# Patient Record
Sex: FEMALE | Race: WHITE | NOT HISPANIC OR LATINO | Employment: OTHER | URBAN - METROPOLITAN AREA
[De-identification: names, ages, dates, MRNs, and addresses within clinical notes are randomized per-mention and may not be internally consistent; named-entity substitution may affect disease eponyms.]

---

## 2017-09-21 ENCOUNTER — RECORDS - HEALTHEAST (OUTPATIENT)
Dept: LAB | Facility: CLINIC | Age: 62
End: 2017-09-21

## 2017-09-21 LAB
CHOLEST SERPL-MCNC: 282 MG/DL
FASTING STATUS PATIENT QL REPORTED: ABNORMAL
HDLC SERPL-MCNC: 60 MG/DL
LDLC SERPL CALC-MCNC: 192 MG/DL
TRIGL SERPL-MCNC: 150 MG/DL

## 2017-09-22 LAB — HCV AB SERPL QL IA: NEGATIVE

## 2018-06-07 ENCOUNTER — RECORDS - HEALTHEAST (OUTPATIENT)
Dept: LAB | Facility: CLINIC | Age: 63
End: 2018-06-07

## 2018-06-07 LAB
ALBUMIN SERPL-MCNC: 3.8 G/DL (ref 3.5–5)
ALP SERPL-CCNC: 90 U/L (ref 45–120)
ALT SERPL W P-5'-P-CCNC: 18 U/L (ref 0–45)
ANION GAP SERPL CALCULATED.3IONS-SCNC: 10 MMOL/L (ref 5–18)
AST SERPL W P-5'-P-CCNC: 30 U/L (ref 0–40)
BILIRUB SERPL-MCNC: 0.4 MG/DL (ref 0–1)
BUN SERPL-MCNC: 12 MG/DL (ref 8–22)
CALCIUM SERPL-MCNC: 9.3 MG/DL (ref 8.5–10.5)
CHLORIDE BLD-SCNC: 103 MMOL/L (ref 98–107)
CHOLEST SERPL-MCNC: 284 MG/DL
CO2 SERPL-SCNC: 26 MMOL/L (ref 22–31)
CREAT SERPL-MCNC: 0.66 MG/DL (ref 0.6–1.1)
FASTING STATUS PATIENT QL REPORTED: ABNORMAL
GFR SERPL CREATININE-BSD FRML MDRD: >60 ML/MIN/1.73M2
GLUCOSE BLD-MCNC: 95 MG/DL (ref 70–125)
HDLC SERPL-MCNC: 64 MG/DL
LDLC SERPL CALC-MCNC: 201 MG/DL
POTASSIUM BLD-SCNC: 3.9 MMOL/L (ref 3.5–5)
PROT SERPL-MCNC: 6.6 G/DL (ref 6–8)
SODIUM SERPL-SCNC: 139 MMOL/L (ref 136–145)
TRIGL SERPL-MCNC: 96 MG/DL

## 2019-05-21 ENCOUNTER — RECORDS - HEALTHEAST (OUTPATIENT)
Dept: LAB | Facility: CLINIC | Age: 64
End: 2019-05-21

## 2019-05-21 LAB
ALBUMIN SERPL-MCNC: 4 G/DL (ref 3.5–5)
ALP SERPL-CCNC: 73 U/L (ref 45–120)
ALT SERPL W P-5'-P-CCNC: 10 U/L (ref 0–45)
ANION GAP SERPL CALCULATED.3IONS-SCNC: 12 MMOL/L (ref 5–18)
AST SERPL W P-5'-P-CCNC: 21 U/L (ref 0–40)
BILIRUB SERPL-MCNC: 0.3 MG/DL (ref 0–1)
BUN SERPL-MCNC: 17 MG/DL (ref 8–22)
CALCIUM SERPL-MCNC: 10.1 MG/DL (ref 8.5–10.5)
CHLORIDE BLD-SCNC: 99 MMOL/L (ref 98–107)
CHOLEST SERPL-MCNC: 217 MG/DL
CO2 SERPL-SCNC: 27 MMOL/L (ref 22–31)
CREAT SERPL-MCNC: 0.76 MG/DL (ref 0.6–1.1)
FASTING STATUS PATIENT QL REPORTED: ABNORMAL
GFR SERPL CREATININE-BSD FRML MDRD: >60 ML/MIN/1.73M2
GLUCOSE BLD-MCNC: 111 MG/DL (ref 70–125)
HDLC SERPL-MCNC: 62 MG/DL
LDLC SERPL CALC-MCNC: 124 MG/DL
POTASSIUM BLD-SCNC: 3.7 MMOL/L (ref 3.5–5)
PROT SERPL-MCNC: 6.8 G/DL (ref 6–8)
SODIUM SERPL-SCNC: 138 MMOL/L (ref 136–145)
TRIGL SERPL-MCNC: 154 MG/DL

## 2019-09-16 ENCOUNTER — RECORDS - HEALTHEAST (OUTPATIENT)
Dept: LAB | Facility: CLINIC | Age: 64
End: 2019-09-16

## 2019-09-17 ENCOUNTER — RECORDS - HEALTHEAST (OUTPATIENT)
Dept: ADMINISTRATIVE | Facility: OTHER | Age: 64
End: 2019-09-17

## 2019-09-17 ENCOUNTER — RECORDS - HEALTHEAST (OUTPATIENT)
Dept: LAB | Facility: CLINIC | Age: 64
End: 2019-09-17

## 2019-09-17 LAB — 25(OH)D3 SERPL-MCNC: 30.7 NG/ML (ref 30–80)

## 2019-09-19 LAB — BACTERIA SPEC CULT: ABNORMAL

## 2019-10-03 ENCOUNTER — AMBULATORY - HEALTHEAST (OUTPATIENT)
Dept: PHARMACY | Facility: CLINIC | Age: 64
End: 2019-10-03

## 2019-10-03 DIAGNOSIS — M85.80 OSTEOPENIA, SENILE: ICD-10-CM

## 2019-10-11 ENCOUNTER — INFUSION - HEALTHEAST (OUTPATIENT)
Dept: INFUSION THERAPY | Facility: CLINIC | Age: 64
End: 2019-10-11

## 2019-10-11 DIAGNOSIS — M85.80 OSTEOPENIA, SENILE: ICD-10-CM

## 2019-10-11 LAB
ALBUMIN SERPL-MCNC: 3.9 G/DL (ref 3.5–5)
ALP SERPL-CCNC: 80 U/L (ref 45–120)
ALT SERPL W P-5'-P-CCNC: <9 U/L (ref 0–45)
ANION GAP SERPL CALCULATED.3IONS-SCNC: 13 MMOL/L (ref 5–18)
AST SERPL W P-5'-P-CCNC: 17 U/L (ref 0–40)
BILIRUB SERPL-MCNC: 0.3 MG/DL (ref 0–1)
BUN SERPL-MCNC: 10 MG/DL (ref 8–22)
CALCIUM SERPL-MCNC: 9.4 MG/DL (ref 8.5–10.5)
CHLORIDE BLD-SCNC: 101 MMOL/L (ref 98–107)
CO2 SERPL-SCNC: 25 MMOL/L (ref 22–31)
CREAT SERPL-MCNC: 0.76 MG/DL (ref 0.6–1.1)
GFR SERPL CREATININE-BSD FRML MDRD: >60 ML/MIN/1.73M2
GLUCOSE BLD-MCNC: 121 MG/DL (ref 70–125)
POTASSIUM BLD-SCNC: 3.4 MMOL/L (ref 3.5–5)
PROT SERPL-MCNC: 7.2 G/DL (ref 6–8)
SODIUM SERPL-SCNC: 139 MMOL/L (ref 136–145)

## 2019-10-11 RX ORDER — HYDROCHLOROTHIAZIDE 12.5 MG/1
12.5 TABLET ORAL
Status: SHIPPED | COMMUNITY
Start: 2019-10-11

## 2019-10-11 RX ORDER — PRAVASTATIN SODIUM 20 MG
20 TABLET ORAL AT BEDTIME
Status: SHIPPED | COMMUNITY
Start: 2019-10-11

## 2020-03-06 ENCOUNTER — RECORDS - HEALTHEAST (OUTPATIENT)
Dept: LAB | Facility: CLINIC | Age: 65
End: 2020-03-06

## 2020-03-06 LAB
ALBUMIN SERPL-MCNC: 4 G/DL (ref 3.5–5)
ALP SERPL-CCNC: 64 U/L (ref 45–120)
ALT SERPL W P-5'-P-CCNC: 12 U/L (ref 0–45)
ANION GAP SERPL CALCULATED.3IONS-SCNC: 11 MMOL/L (ref 5–18)
AST SERPL W P-5'-P-CCNC: 19 U/L (ref 0–40)
BILIRUB SERPL-MCNC: 0.4 MG/DL (ref 0–1)
BUN SERPL-MCNC: 15 MG/DL (ref 8–22)
CALCIUM SERPL-MCNC: 9.6 MG/DL (ref 8.5–10.5)
CHLORIDE BLD-SCNC: 97 MMOL/L (ref 98–107)
CHOLEST SERPL-MCNC: 232 MG/DL
CO2 SERPL-SCNC: 29 MMOL/L (ref 22–31)
CREAT SERPL-MCNC: 0.71 MG/DL (ref 0.6–1.1)
FASTING STATUS PATIENT QL REPORTED: ABNORMAL
GFR SERPL CREATININE-BSD FRML MDRD: >60 ML/MIN/1.73M2
GLUCOSE BLD-MCNC: 100 MG/DL (ref 70–125)
HDLC SERPL-MCNC: 71 MG/DL
LDLC SERPL CALC-MCNC: 142 MG/DL
POTASSIUM BLD-SCNC: 3.3 MMOL/L (ref 3.5–5)
PROT SERPL-MCNC: 7.1 G/DL (ref 6–8)
SODIUM SERPL-SCNC: 137 MMOL/L (ref 136–145)
TRIGL SERPL-MCNC: 97 MG/DL

## 2020-03-08 LAB — BACTERIA SPEC CULT: ABNORMAL

## 2020-03-26 ENCOUNTER — RECORDS - HEALTHEAST (OUTPATIENT)
Dept: LAB | Facility: CLINIC | Age: 65
End: 2020-03-26

## 2020-03-26 LAB
ANION GAP SERPL CALCULATED.3IONS-SCNC: 12 MMOL/L (ref 5–18)
BUN SERPL-MCNC: 15 MG/DL (ref 8–22)
CALCIUM SERPL-MCNC: 9.2 MG/DL (ref 8.5–10.5)
CHLORIDE BLD-SCNC: 99 MMOL/L (ref 98–107)
CO2 SERPL-SCNC: 26 MMOL/L (ref 22–31)
CREAT SERPL-MCNC: 0.73 MG/DL (ref 0.6–1.1)
GFR SERPL CREATININE-BSD FRML MDRD: >60 ML/MIN/1.73M2
GLUCOSE BLD-MCNC: 95 MG/DL (ref 70–125)
POTASSIUM BLD-SCNC: 3.5 MMOL/L (ref 3.5–5)
SODIUM SERPL-SCNC: 137 MMOL/L (ref 136–145)

## 2020-03-28 LAB — BACTERIA SPEC CULT: ABNORMAL

## 2020-11-10 ENCOUNTER — RECORDS - HEALTHEAST (OUTPATIENT)
Dept: LAB | Facility: CLINIC | Age: 65
End: 2020-11-10

## 2020-11-10 LAB
ANION GAP SERPL CALCULATED.3IONS-SCNC: 10 MMOL/L (ref 5–18)
BUN SERPL-MCNC: 17 MG/DL (ref 8–22)
CALCIUM SERPL-MCNC: 10 MG/DL (ref 8.5–10.5)
CHLORIDE BLD-SCNC: 99 MMOL/L (ref 98–107)
CO2 SERPL-SCNC: 28 MMOL/L (ref 22–31)
CREAT SERPL-MCNC: 0.79 MG/DL (ref 0.6–1.1)
GFR SERPL CREATININE-BSD FRML MDRD: >60 ML/MIN/1.73M2
GLUCOSE BLD-MCNC: 116 MG/DL (ref 70–125)
POTASSIUM BLD-SCNC: 3.7 MMOL/L (ref 3.5–5)
SODIUM SERPL-SCNC: 137 MMOL/L (ref 136–145)

## 2020-11-11 ENCOUNTER — AMBULATORY - HEALTHEAST (OUTPATIENT)
Dept: PHARMACY | Facility: CLINIC | Age: 65
End: 2020-11-11

## 2021-04-16 ENCOUNTER — RECORDS - HEALTHEAST (OUTPATIENT)
Dept: LAB | Facility: CLINIC | Age: 66
End: 2021-04-16

## 2021-04-16 LAB
ANION GAP SERPL CALCULATED.3IONS-SCNC: 12 MMOL/L (ref 5–18)
BUN SERPL-MCNC: 19 MG/DL (ref 8–22)
CALCIUM SERPL-MCNC: 9.3 MG/DL (ref 8.5–10.5)
CHLORIDE BLD-SCNC: 99 MMOL/L (ref 98–107)
CO2 SERPL-SCNC: 28 MMOL/L (ref 22–31)
CREAT SERPL-MCNC: 0.79 MG/DL (ref 0.6–1.1)
GFR SERPL CREATININE-BSD FRML MDRD: >60 ML/MIN/1.73M2
GLUCOSE BLD-MCNC: 77 MG/DL (ref 70–125)
POTASSIUM BLD-SCNC: 4 MMOL/L (ref 3.5–5)
SODIUM SERPL-SCNC: 139 MMOL/L (ref 136–145)

## 2021-05-12 ENCOUNTER — AMBULATORY - HEALTHEAST (OUTPATIENT)
Dept: PHARMACY | Facility: HOSPITAL | Age: 66
End: 2021-05-12

## 2021-05-26 VITALS
DIASTOLIC BLOOD PRESSURE: 52 MMHG | RESPIRATION RATE: 16 BRPM | TEMPERATURE: 98.2 F | HEART RATE: 91 BPM | SYSTOLIC BLOOD PRESSURE: 139 MMHG | OXYGEN SATURATION: 100 %

## 2021-05-30 ENCOUNTER — RECORDS - HEALTHEAST (OUTPATIENT)
Dept: ADMINISTRATIVE | Facility: CLINIC | Age: 66
End: 2021-05-30

## 2021-06-02 NOTE — PROGRESS NOTES
Patient ambulated into [facility] by herself. Patient is alert and oriented and VSS. A peripehral IV was started in her right AC and labs drawn. Patient received Reclast infusion without any problems. Peripheral IV flushed with Normal Saline and discontinued. All questions answered and patient was discharged home.

## 2021-06-16 PROBLEM — M85.80 OSTEOPENIA, SENILE: Status: ACTIVE | Noted: 2019-10-03

## 2021-06-22 DIAGNOSIS — M85.80 OSTEOPENIA, SENILE: Primary | ICD-10-CM

## 2021-06-22 RX ORDER — METHYLPREDNISOLONE SODIUM SUCCINATE 125 MG/2ML
125 INJECTION, POWDER, LYOPHILIZED, FOR SOLUTION INTRAMUSCULAR; INTRAVENOUS
Status: CANCELLED
Start: 2021-07-12

## 2021-06-22 RX ORDER — MEPERIDINE HYDROCHLORIDE 25 MG/ML
25 INJECTION INTRAMUSCULAR; INTRAVENOUS; SUBCUTANEOUS EVERY 30 MIN PRN
Status: CANCELLED | OUTPATIENT
Start: 2021-07-12

## 2021-06-22 RX ORDER — HEPARIN SODIUM,PORCINE 10 UNIT/ML
5 VIAL (ML) INTRAVENOUS
Status: CANCELLED | OUTPATIENT
Start: 2021-07-12

## 2021-06-22 RX ORDER — EPINEPHRINE 1 MG/ML
0.3 INJECTION, SOLUTION, CONCENTRATE INTRAVENOUS EVERY 5 MIN PRN
Status: CANCELLED | OUTPATIENT
Start: 2021-07-12

## 2021-06-22 RX ORDER — ALBUTEROL SULFATE 0.83 MG/ML
2.5 SOLUTION RESPIRATORY (INHALATION)
Status: CANCELLED | OUTPATIENT
Start: 2021-07-12

## 2021-06-22 RX ORDER — NALOXONE HYDROCHLORIDE 0.4 MG/ML
0.2 INJECTION, SOLUTION INTRAMUSCULAR; INTRAVENOUS; SUBCUTANEOUS
Status: CANCELLED | OUTPATIENT
Start: 2021-07-12

## 2021-06-22 RX ORDER — HEPARIN SODIUM (PORCINE) LOCK FLUSH IV SOLN 100 UNIT/ML 100 UNIT/ML
5 SOLUTION INTRAVENOUS
Status: CANCELLED | OUTPATIENT
Start: 2021-07-12

## 2021-06-22 RX ORDER — ZOLEDRONIC ACID 5 MG/100ML
5 INJECTION, SOLUTION INTRAVENOUS ONCE
Status: CANCELLED
Start: 2021-07-12 | End: 2021-07-12

## 2021-06-22 RX ORDER — DIPHENHYDRAMINE HYDROCHLORIDE 50 MG/ML
50 INJECTION INTRAMUSCULAR; INTRAVENOUS
Status: CANCELLED
Start: 2021-07-12

## 2021-06-22 RX ORDER — ALBUTEROL SULFATE 90 UG/1
1-2 AEROSOL, METERED RESPIRATORY (INHALATION)
Status: CANCELLED
Start: 2021-07-12

## 2022-01-09 ENCOUNTER — LAB REQUISITION (OUTPATIENT)
Dept: LAB | Facility: CLINIC | Age: 67
End: 2022-01-09
Payer: COMMERCIAL

## 2022-01-09 DIAGNOSIS — J01.40 ACUTE PANSINUSITIS, UNSPECIFIED: ICD-10-CM

## 2022-01-09 PROCEDURE — U0005 INFEC AGEN DETEC AMPLI PROBE: HCPCS | Mod: ORL | Performed by: PHYSICIAN ASSISTANT

## 2022-01-10 LAB — SARS-COV-2 RNA RESP QL NAA+PROBE: NEGATIVE

## 2022-03-25 ENCOUNTER — LAB REQUISITION (OUTPATIENT)
Dept: LAB | Facility: CLINIC | Age: 67
End: 2022-03-25

## 2022-03-25 DIAGNOSIS — I10 ESSENTIAL (PRIMARY) HYPERTENSION: ICD-10-CM

## 2022-03-25 DIAGNOSIS — E78.5 HYPERLIPIDEMIA, UNSPECIFIED: ICD-10-CM

## 2022-03-25 LAB
ANION GAP SERPL CALCULATED.3IONS-SCNC: 13 MMOL/L (ref 5–18)
BUN SERPL-MCNC: 12 MG/DL (ref 8–22)
CALCIUM SERPL-MCNC: 10.2 MG/DL (ref 8.5–10.5)
CHLORIDE BLD-SCNC: 97 MMOL/L (ref 98–107)
CHOLEST SERPL-MCNC: 304 MG/DL
CO2 SERPL-SCNC: 28 MMOL/L (ref 22–31)
CREAT SERPL-MCNC: 0.82 MG/DL (ref 0.6–1.1)
FASTING STATUS PATIENT QL REPORTED: ABNORMAL
GFR SERPL CREATININE-BSD FRML MDRD: 77 ML/MIN/1.73M2
GLUCOSE BLD-MCNC: 75 MG/DL (ref 70–125)
HDLC SERPL-MCNC: 72 MG/DL
LDLC SERPL CALC-MCNC: 198 MG/DL
POTASSIUM BLD-SCNC: 3.9 MMOL/L (ref 3.5–5)
SODIUM SERPL-SCNC: 138 MMOL/L (ref 136–145)
TRIGL SERPL-MCNC: 168 MG/DL

## 2022-03-25 PROCEDURE — 80061 LIPID PANEL: CPT | Performed by: PHYSICIAN ASSISTANT

## 2022-03-25 PROCEDURE — 80048 BASIC METABOLIC PNL TOTAL CA: CPT | Performed by: PHYSICIAN ASSISTANT

## 2023-03-07 ENCOUNTER — LAB REQUISITION (OUTPATIENT)
Dept: LAB | Facility: CLINIC | Age: 68
End: 2023-03-07

## 2023-03-07 DIAGNOSIS — I10 ESSENTIAL (PRIMARY) HYPERTENSION: ICD-10-CM

## 2023-03-07 DIAGNOSIS — E78.5 HYPERLIPIDEMIA, UNSPECIFIED: ICD-10-CM

## 2023-03-07 DIAGNOSIS — F33.2 MAJOR DEPRESSIVE DISORDER, RECURRENT SEVERE WITHOUT PSYCHOTIC FEATURES (H): ICD-10-CM

## 2023-03-07 LAB
ALBUMIN SERPL BCG-MCNC: 4.4 G/DL (ref 3.5–5.2)
ALP SERPL-CCNC: 83 U/L (ref 35–104)
ALT SERPL W P-5'-P-CCNC: 9 U/L (ref 10–35)
ANION GAP SERPL CALCULATED.3IONS-SCNC: 15 MMOL/L (ref 7–15)
AST SERPL W P-5'-P-CCNC: 20 U/L (ref 10–35)
BILIRUB SERPL-MCNC: 0.2 MG/DL
BUN SERPL-MCNC: 17.9 MG/DL (ref 8–23)
CALCIUM SERPL-MCNC: 9.9 MG/DL (ref 8.8–10.2)
CHLORIDE SERPL-SCNC: 97 MMOL/L (ref 98–107)
CHOLEST SERPL-MCNC: 243 MG/DL
CREAT SERPL-MCNC: 0.86 MG/DL (ref 0.51–0.95)
DEPRECATED HCO3 PLAS-SCNC: 26 MMOL/L (ref 22–29)
GFR SERPL CREATININE-BSD FRML MDRD: 73 ML/MIN/1.73M2
GLUCOSE SERPL-MCNC: 115 MG/DL (ref 70–99)
HDLC SERPL-MCNC: 71 MG/DL
LDLC SERPL CALC-MCNC: 140 MG/DL
NONHDLC SERPL-MCNC: 172 MG/DL
POTASSIUM SERPL-SCNC: 4.3 MMOL/L (ref 3.4–5.3)
PROT SERPL-MCNC: 7.2 G/DL (ref 6.4–8.3)
SODIUM SERPL-SCNC: 138 MMOL/L (ref 136–145)
T4 FREE SERPL-MCNC: 1.31 NG/DL (ref 0.9–1.7)
TRIGL SERPL-MCNC: 162 MG/DL
TSH SERPL DL<=0.005 MIU/L-ACNC: 4.25 UIU/ML (ref 0.3–4.2)

## 2023-03-07 PROCEDURE — 80053 COMPREHEN METABOLIC PANEL: CPT | Performed by: PHYSICIAN ASSISTANT

## 2023-03-07 PROCEDURE — 80061 LIPID PANEL: CPT | Performed by: PHYSICIAN ASSISTANT

## 2023-03-07 PROCEDURE — 84439 ASSAY OF FREE THYROXINE: CPT | Performed by: PHYSICIAN ASSISTANT

## 2023-03-07 PROCEDURE — 84443 ASSAY THYROID STIM HORMONE: CPT | Performed by: PHYSICIAN ASSISTANT

## 2023-04-03 ENCOUNTER — LAB REQUISITION (OUTPATIENT)
Dept: LAB | Facility: CLINIC | Age: 68
End: 2023-04-03

## 2023-04-03 DIAGNOSIS — K14.6 GLOSSODYNIA: ICD-10-CM

## 2023-04-03 LAB — VIT B12 SERPL-MCNC: 524 PG/ML (ref 232–1245)

## 2023-04-03 PROCEDURE — 82607 VITAMIN B-12: CPT | Performed by: PHYSICIAN ASSISTANT

## 2023-04-03 PROCEDURE — 82306 VITAMIN D 25 HYDROXY: CPT | Performed by: PHYSICIAN ASSISTANT

## 2023-04-04 LAB — DEPRECATED CALCIDIOL+CALCIFEROL SERPL-MC: 27 UG/L (ref 20–75)

## 2023-10-02 ENCOUNTER — APPOINTMENT (OUTPATIENT)
Dept: CT IMAGING | Facility: CLINIC | Age: 68
End: 2023-10-02
Attending: EMERGENCY MEDICINE
Payer: COMMERCIAL

## 2023-10-02 ENCOUNTER — HOSPITAL ENCOUNTER (EMERGENCY)
Facility: CLINIC | Age: 68
Discharge: HOME | End: 2023-10-02
Attending: EMERGENCY MEDICINE | Admitting: EMERGENCY MEDICINE
Payer: COMMERCIAL

## 2023-10-02 VITALS
DIASTOLIC BLOOD PRESSURE: 105 MMHG | HEART RATE: 109 BPM | SYSTOLIC BLOOD PRESSURE: 166 MMHG | OXYGEN SATURATION: 95 % | RESPIRATION RATE: 18 BRPM | TEMPERATURE: 98.2 F

## 2023-10-02 DIAGNOSIS — K62.89 PROCTITIS: ICD-10-CM

## 2023-10-02 DIAGNOSIS — R19.7 DIARRHEA, UNSPECIFIED TYPE: ICD-10-CM

## 2023-10-02 LAB
ALBUMIN SERPL BCG-MCNC: 4.5 G/DL (ref 3.5–5.2)
ALBUMIN UR-MCNC: 10 MG/DL
ALP SERPL-CCNC: 100 U/L (ref 35–104)
ALT SERPL W P-5'-P-CCNC: 10 U/L (ref 0–50)
ANION GAP SERPL CALCULATED.3IONS-SCNC: 10 MMOL/L (ref 7–15)
APPEARANCE UR: CLEAR
AST SERPL W P-5'-P-CCNC: 23 U/L (ref 0–45)
BASO+EOS+MONOS # BLD AUTO: ABNORMAL 10*3/UL
BASO+EOS+MONOS NFR BLD AUTO: ABNORMAL %
BASOPHILS # BLD AUTO: 0 10E3/UL (ref 0–0.2)
BASOPHILS NFR BLD AUTO: 1 %
BILIRUB DIRECT SERPL-MCNC: <0.2 MG/DL (ref 0–0.3)
BILIRUB SERPL-MCNC: 0.2 MG/DL
BILIRUB UR QL STRIP: NEGATIVE
BUN SERPL-MCNC: 8.7 MG/DL (ref 8–23)
CALCIUM SERPL-MCNC: 9.5 MG/DL (ref 8.8–10.2)
CHLORIDE SERPL-SCNC: 97 MMOL/L (ref 98–107)
COLOR UR AUTO: ABNORMAL
CREAT SERPL-MCNC: 0.74 MG/DL (ref 0.51–0.95)
CRP SERPL-MCNC: 13.4 MG/L
DEPRECATED HCO3 PLAS-SCNC: 29 MMOL/L (ref 22–29)
EGFRCR SERPLBLD CKD-EPI 2021: 87 ML/MIN/1.73M2
EOSINOPHIL # BLD AUTO: 0.1 10E3/UL (ref 0–0.7)
EOSINOPHIL NFR BLD AUTO: 1 %
ERYTHROCYTE [DISTWIDTH] IN BLOOD BY AUTOMATED COUNT: 15.4 % (ref 10–15)
GLUCOSE SERPL-MCNC: 93 MG/DL (ref 70–99)
GLUCOSE UR STRIP-MCNC: NEGATIVE MG/DL
HCT VFR BLD AUTO: 38 % (ref 35–47)
HGB BLD-MCNC: 12.2 G/DL (ref 11.7–15.7)
HGB UR QL STRIP: NEGATIVE
IMM GRANULOCYTES # BLD: 0 10E3/UL
IMM GRANULOCYTES NFR BLD: 0 %
KETONES UR STRIP-MCNC: NEGATIVE MG/DL
LACTATE SERPL-SCNC: 1.2 MMOL/L (ref 0.7–2)
LEUKOCYTE ESTERASE UR QL STRIP: ABNORMAL
LIPASE SERPL-CCNC: 31 U/L (ref 13–60)
LYMPHOCYTES # BLD AUTO: 2 10E3/UL (ref 0.8–5.3)
LYMPHOCYTES NFR BLD AUTO: 41 %
MAGNESIUM SERPL-MCNC: 1.8 MG/DL (ref 1.7–2.3)
MCH RBC QN AUTO: 25.8 PG (ref 26.5–33)
MCHC RBC AUTO-ENTMCNC: 32.1 G/DL (ref 31.5–36.5)
MCV RBC AUTO: 81 FL (ref 78–100)
MONOCYTES # BLD AUTO: 0.7 10E3/UL (ref 0–1.3)
MONOCYTES NFR BLD AUTO: 13 %
MUCOUS THREADS #/AREA URNS LPF: PRESENT /LPF
NEUTROPHILS # BLD AUTO: 2.2 10E3/UL (ref 1.6–8.3)
NEUTROPHILS NFR BLD AUTO: 44 %
NITRATE UR QL: NEGATIVE
NRBC # BLD AUTO: 0 10E3/UL
NRBC BLD AUTO-RTO: 0 /100
PH UR STRIP: 6.5 [PH] (ref 5–7)
PLATELET # BLD AUTO: 355 10E3/UL (ref 150–450)
POTASSIUM SERPL-SCNC: 3.4 MMOL/L (ref 3.4–5.3)
PROT SERPL-MCNC: 7.6 G/DL (ref 6.4–8.3)
RBC # BLD AUTO: 4.72 10E6/UL (ref 3.8–5.2)
RBC URINE: 5 /HPF
SODIUM SERPL-SCNC: 136 MMOL/L (ref 135–145)
SP GR UR STRIP: <1.005 (ref 1–1.03)
SQUAMOUS EPITHELIAL: 2 /HPF
UROBILINOGEN UR STRIP-MCNC: <2 MG/DL
WBC # BLD AUTO: 5 10E3/UL (ref 4–11)
WBC URINE: 17 /HPF

## 2023-10-02 PROCEDURE — 87086 URINE CULTURE/COLONY COUNT: CPT | Performed by: FAMILY MEDICINE

## 2023-10-02 PROCEDURE — 86140 C-REACTIVE PROTEIN: CPT | Performed by: EMERGENCY MEDICINE

## 2023-10-02 PROCEDURE — 99285 EMERGENCY DEPT VISIT HI MDM: CPT | Mod: 25

## 2023-10-02 PROCEDURE — 82248 BILIRUBIN DIRECT: CPT | Performed by: FAMILY MEDICINE

## 2023-10-02 PROCEDURE — 96360 HYDRATION IV INFUSION INIT: CPT | Mod: 59

## 2023-10-02 PROCEDURE — 258N000003 HC RX IP 258 OP 636: Performed by: FAMILY MEDICINE

## 2023-10-02 PROCEDURE — 83735 ASSAY OF MAGNESIUM: CPT | Performed by: FAMILY MEDICINE

## 2023-10-02 PROCEDURE — 83605 ASSAY OF LACTIC ACID: CPT | Performed by: FAMILY MEDICINE

## 2023-10-02 PROCEDURE — 80053 COMPREHEN METABOLIC PANEL: CPT | Performed by: FAMILY MEDICINE

## 2023-10-02 PROCEDURE — 83690 ASSAY OF LIPASE: CPT | Performed by: FAMILY MEDICINE

## 2023-10-02 PROCEDURE — 96361 HYDRATE IV INFUSION ADD-ON: CPT

## 2023-10-02 PROCEDURE — 81001 URINALYSIS AUTO W/SCOPE: CPT | Performed by: FAMILY MEDICINE

## 2023-10-02 PROCEDURE — 82374 ASSAY BLOOD CARBON DIOXIDE: CPT | Performed by: FAMILY MEDICINE

## 2023-10-02 PROCEDURE — 85025 COMPLETE CBC W/AUTO DIFF WBC: CPT | Performed by: FAMILY MEDICINE

## 2023-10-02 PROCEDURE — 36415 COLL VENOUS BLD VENIPUNCTURE: CPT | Performed by: FAMILY MEDICINE

## 2023-10-02 PROCEDURE — 74177 CT ABD & PELVIS W/CONTRAST: CPT

## 2023-10-02 PROCEDURE — 250N000011 HC RX IP 250 OP 636: Mod: JZ | Performed by: EMERGENCY MEDICINE

## 2023-10-02 RX ORDER — IOPAMIDOL 755 MG/ML
90 INJECTION, SOLUTION INTRAVASCULAR ONCE
Status: COMPLETED | OUTPATIENT
Start: 2023-10-02 | End: 2023-10-02

## 2023-10-02 RX ADMIN — SODIUM CHLORIDE 1000 ML: 9 INJECTION, SOLUTION INTRAVENOUS at 17:28

## 2023-10-02 RX ADMIN — IOPAMIDOL 90 ML: 755 INJECTION, SOLUTION INTRAVENOUS at 17:16

## 2023-10-02 ASSESSMENT — ACTIVITIES OF DAILY LIVING (ADL): ADLS_ACUITY_SCORE: 35

## 2023-10-02 NOTE — ED TRIAGE NOTES
"Pt presents to the ED with c/o dehydration. Pt reports that she's been sick since 9/17, dx with colitis. Pt states \"I've only drank two water bottles since the 17th\". Pt endorses N/V/D. Pt reports not being able to keep anything down. Denies any fevers at home. Is scheduled for a GI appointment tomorrow to be tested for c.diff.      Triage Assessment       Row Name 10/02/23 8344       Triage Assessment (Adult)    Airway WDL WDL       Respiratory WDL    Respiratory WDL WDL       Skin Circulation/Temperature WDL    Skin Circulation/Temperature WDL WDL       Cardiac WDL    Cardiac WDL WDL       Peripheral/Neurovascular WDL    Peripheral Neurovascular WDL WDL       Cognitive/Neuro/Behavioral WDL    Cognitive/Neuro/Behavioral WDL WDL                    "

## 2023-10-02 NOTE — ED PROVIDER NOTES
"EMERGENCY DEPARTMENT ENCOUNTER      NAME: Arlene Becker  AGE: 68 year old female  YOB: 1955  MRN: [id number]  EVALUATION DATE & TIME: No admission date for patient encounter.    PCP: Vivian Aranda    ED PROVIDER: Ismael Pierre M.D.      Chief Complaint   Patient presents with    Dehydration    Nausea, Vomiting, & Diarrhea         FINAL IMPRESSION:  Proctitis  Diarrhea      ED COURSE & MEDICAL DECISION MAKING:    Pertinent Labs & Imaging studies reviewed. (See chart for details)  68 year old female presents to the Emergency Department for evaluation of ongoing diarrhea and abdominal pain.  Patient ports symptoms have been ongoing for almost 2 weeks.  Patient relates its \"my colitis\".  Patient states she has a history of ulcerative colitis and is scheduled to see GI.  Typically they treat this with budesonide but are unwilling to start therapy until patient is checked for C. difficile.  Patient reports last antibiotics more than 5 months ago.  Patient seen in triage area due to ED overcrowding.  Exam is quite benign.  She has hypoactive bowel sounds but with only minimal tenderness.  We will proceed with laboratory evaluation and CT imaging to assess for evidence of infection, colitis, dehydration. Patient appears non toxic with stable vitals signs. Overall exam is benign.    4:50 PM I met with the patient and performed the physical exam.   6:46 PM.  CBC is unremarkable.  White cell count is 5.0.  7 PM.  Patient discussed with [facility].  Patient with leukoclastic colitis rather than ulcerative colitis.  As her laboratory evaluation is reassuring patient may follow-up tomorrow to allow testing for C. difficile prior to starting medications.   7:09 PM I rechecked ad updated the patient. At the conclusion of the encounter I discussed the results of all of the tests and the disposition. The questions were answered and return precautions provided. The patient or family acknowledged understanding and was " agreeable with the care plan.      .     Medical Decision Making    History:  Supplemental history from: Documented in chart, if applicable  External Record(s) reviewed: Documented in chart, if applicable.    Work Up:  Chart documentation includes differential considered and any EKGs or imaging independently interpreted by provider, where specified.  In additional to work up documented, I considered the following work up: Documented in chart, if applicable.    External consultation:  Discussion of management with another provider: Documented in chart, if applicable    Complicating factors:  Care impacted by chronic illness: Chronic Pain, Hyperlipidemia, Hypertension, Mental Health, and Other: ulcerative colitis  Care affected by social determinants of health: N/A    Disposition considerations: Discharge. No recommendations on prescription strength medication(s). See documentation for any additional details.       MEDICATIONS GIVEN IN THE EMERGENCY:  Medications   sodium chloride 0.9% BOLUS 1,000 mL (has no administration in time range)       NEW PRESCRIPTIONS STARTED AT TODAY'S ER VISIT  New Prescriptions    No medications on file          =================================================================    HPI    Patient information was obtained from: patient    Use of Intrepreter: N/A       Arlene Becker is a 68 year old female with a pertient medical history of ulcerative colitis, HTN, and hyperlipidemia who presents to the ED for evaluation of nausea, vomiting, and diarrhea.    Patient reports having a colitis flare-up with fever, chills, abdominal pain, and prolonged diarrhea that started on 9/17/23. She says she called her gastroenterologist a couple days ago and has an appointment tomorrow to go and get some testing done before she could get her budesonide refilled. She says she has not needed budesonide for a number of years prior. Patient has history of hysterectomy and says the last antibiotics taken were  months ago. Otherwise, patient denies any blood in stool, anything new in diet, or any recent antibiotics.      REVIEW OF SYSTEMS   Constitutional:  Denies fever, chills  Respiratory:  Denies productive cough or increased work of breathing  Cardiovascular:  Denies chest pain, palpitations  GI:  Denies abdominal pain, nausea, vomiting, or change in bowel or bladder habits   Musculoskeletal:  Denies any new muscle/joint swelling  Skin:  Denies rash   Neurologic:  Denies focal weakness  All systems negative except as marked.     PAST MEDICAL HISTORY:  History reviewed. No pertinent past medical history.    PAST SURGICAL HISTORY:  Past Surgical History:   Procedure Laterality Date     SECTION      HYSTERECTOMY      RELEASE CARPAL TUNNEL      TEMPOROMANDIBULAR JOINT ARTHROPLASTY           CURRENT MEDICATIONS:      Current Facility-Administered Medications:     sodium chloride 0.9% BOLUS 1,000 mL, 1,000 mL, Intravenous, Once, Cory Rodriguez MD    Current Outpatient Medications:     budesonide (ENTOCORT EC) 3 mg 24 hr capsule, [BUDESONIDE (ENTOCORT EC) 3 MG 24 HR CAPSULE] Take 3 mg by mouth every morning., Disp: , Rfl:     calcium-vitamin D (CALCIUM-VITAMIN D) 500 mg(1,250mg) -200 unit per tablet, [CALCIUM-VITAMIN D (CALCIUM-VITAMIN D) 500 MG(1,250MG) -200 UNIT PER TABLET] Take 2 tablets by mouth daily., Disp: , Rfl:     cyanocobalamin, vitamin B-12, 1,000 mcg/15 mL Liqd, [CYANOCOBALAMIN, VITAMIN B-12, 1,000 MCG/15 ML LIQD] Take 1,000 mg by mouth daily., Disp: , Rfl:     esomeprazole (NEXIUM) 20 MG capsule, [ESOMEPRAZOLE (NEXIUM) 20 MG CAPSULE] Take 20 mg by mouth every morning before breakfast., Disp: , Rfl:     hydroCHLOROthiazide (HYDRODIURIL) 12.5 MG tablet, [HYDROCHLOROTHIAZIDE (HYDRODIURIL) 12.5 MG TABLET] Take 12.5 mg by mouth 2 (two) times a day at 9am and 6pm., Disp: , Rfl:     pravastatin (PRAVACHOL) 20 MG tablet, [PRAVASTATIN (PRAVACHOL) 20 MG TABLET] Take 20 mg by mouth at bedtime., Disp: , Rfl:      rosuvastatin (CRESTOR) 5 MG tablet, [ROSUVASTATIN (CRESTOR) 5 MG TABLET] Take 5 mg by mouth bedtime., Disp: , Rfl:     spironolactone (ALDACTONE) 25 MG tablet, [SPIRONOLACTONE (ALDACTONE) 25 MG TABLET] Take 25 mg by mouth daily., Disp: , Rfl:     venlafaxine (EFFEXOR-XR) 75 MG 24 hr capsule, [VENLAFAXINE (EFFEXOR-XR) 75 MG 24 HR CAPSULE] Take 75 mg by mouth daily., Disp: , Rfl:     ALLERGIES:  Allergies   Allergen Reactions    Sulfa (Sulfonamide Antibiotics) [Sulfa Antibiotics] Hives       FAMILY HISTORY:  Family History   Problem Relation Age of Onset    Coronary Artery Disease Mother     Coronary Artery Disease Father     Coronary Artery Disease Brother         stent       SOCIAL HISTORY:   Social History     Socioeconomic History    Marital status:      Spouse name: None    Number of children: None    Years of education: None    Highest education level: None   Tobacco Use    Smoking status: Former     Types: Cigarettes     Quit date: 5/1/2013     Years since quitting: 10.4       VITALS:  Patient Vitals for the past 24 hrs:   BP Temp Temp src Pulse Resp SpO2   10/02/23 1342 (!) 166/105 98.2  F (36.8  C) Oral 109 18 95 %        PHYSICAL EXAM    Constitutional:  Awake, alert, in mild distress  HENT:  Normocephalic, Atraumatic. Bilateral external ears normal. Oropharynx moist. Nose normal. Neck- Normal range of motion with no guarding, No midline cervical tenderness, Supple, No stridor.   Eyes:  PERRL, EOMI with no signs of entrapment, Conjunctiva normal, No discharge.   Respiratory:  Normal breath sounds, No respiratory distress, No wheezing.    Cardiovascular:  Normal heart rate, Normal rhythm, No appreciable rubs or gallops.   GI:  Soft, No tenderness, No distension, No palpable masses. Hypoactive bowel sounds.  Musculoskeletal:  Intact distal pulses, No edema. Good range of motion in all major joints. No tenderness to palpation or major deformities noted.  Integument:  Warm, Dry, No erythema, No rash.    Neurologic:  Alert & oriented, Normal motor function, Normal sensory function, No focal deficits noted.   Psychiatric:  Affect normal, Judgment normal, Mood normal.     LAB:  All pertinent labs reviewed and interpreted.  Results for orders placed or performed during the hospital encounter of 10/02/23   Basic metabolic panel   Result Value Ref Range    Sodium 136 135 - 145 mmol/L    Potassium 3.4 3.4 - 5.3 mmol/L    Chloride 97 (L) 98 - 107 mmol/L    Carbon Dioxide (CO2) 29 22 - 29 mmol/L    Anion Gap 10 7 - 15 mmol/L    Urea Nitrogen 8.7 8.0 - 23.0 mg/dL    Creatinine 0.74 0.51 - 0.95 mg/dL    GFR Estimate 87 >60 mL/min/1.73m2    Calcium 9.5 8.8 - 10.2 mg/dL    Glucose 93 70 - 99 mg/dL   Hepatic function panel   Result Value Ref Range    Protein Total 7.6 6.4 - 8.3 g/dL    Albumin 4.5 3.5 - 5.2 g/dL    Bilirubin Total 0.2 <=1.2 mg/dL    Alkaline Phosphatase 100 35 - 104 U/L    AST 23 0 - 45 U/L    ALT 10 0 - 50 U/L    Bilirubin Direct <0.20 0.00 - 0.30 mg/dL   Lactic acid whole blood   Result Value Ref Range    Lactic Acid 1.2 0.7 - 2.0 mmol/L   Result Value Ref Range    Lipase 31 13 - 60 U/L   Result Value Ref Range    Magnesium 1.8 1.7 - 2.3 mg/dL   CBC with platelets and differential   Result Value Ref Range    WBC Count 5.0 4.0 - 11.0 10e3/uL    RBC Count 4.72 3.80 - 5.20 10e6/uL    Hemoglobin 12.2 11.7 - 15.7 g/dL    Hematocrit 38.0 35.0 - 47.0 %    MCV 81 78 - 100 fL    MCH 25.8 (L) 26.5 - 33.0 pg    MCHC 32.1 31.5 - 36.5 g/dL    RDW 15.4 (H) 10.0 - 15.0 %    Platelet Count 355 150 - 450 10e3/uL    % Neutrophils 44 %    % Lymphocytes 41 %    % Monocytes 13 %    Mids % (Monos, Eos, Basos)      % Eosinophils 1 %    % Basophils 1 %    % Immature Granulocytes 0 %    NRBCs per 100 WBC 0 <1 /100    Absolute Neutrophils 2.2 1.6 - 8.3 10e3/uL    Absolute Lymphocytes 2.0 0.8 - 5.3 10e3/uL    Absolute Monocytes 0.7 0.0 - 1.3 10e3/uL    Mids Abs (Monos, Eos, Basos)      Absolute Eosinophils 0.1 0.0 - 0.7 10e3/uL     Absolute Basophils 0.0 0.0 - 0.2 10e3/uL    Absolute Immature Granulocytes 0.0 <=0.4 10e3/uL    Absolute NRBCs 0.0 10e3/uL       RADIOLOGY:  Reviewed all pertinent imaging. Please see official radiology report.  CT Abdomen Pelvis w Contrast    Result Date: 10/2/2023  EXAM: CT ABDOMEN PELVIS W CONTRAST LOCATION: [facility] DATE: 10/2/2023 INDICATION: Diarrhea. History of ulcerative colitis. COMPARISON: CT abdomen pelvis 12/27/2011. TECHNIQUE: CT scan of the abdomen and pelvis was performed following injection of IV contrast. Multiplanar reformats were obtained. Dose reduction techniques were used. CONTRAST: 90ml Isovue 370 FINDINGS: LOWER CHEST: Normal. HEPATOBILIARY: Small right hepatic cyst. An additional subcentimeter hypodensity within the left hepatic lobe is too small to characterize but is also likely a cyst or other benign lesion. No suspicious liver lesions. No calcified gallstones or biliary ductal dilation. PANCREAS: Normal. SPLEEN: Normal. ADRENAL GLANDS: Normal. KIDNEYS/BLADDER: Normal. BOWEL: No bowel obstruction. No small bowel inflammation. Normal appendix. Fluid throughout the ascending, transverse, and descending colon. Multiple noninflamed descending and sigmoid colonic diverticuli. Mild wall thickening and mural enhancement of the mid and lower rectum (for example, 3/#161), suggesting proctitis. No other definite sites of active large bowel inflammation. No pneumatosis or colonic dilation. LYMPH NODES: No enlarged lymph nodes. VASCULATURE: Moderate aortobiiliac atherosclerosis. Fusiform ectasia of the infrarenal aorta measuring up to 2.5 cm. Patent portal, splenic, and superior mesenteric veins. PELVIC ORGANS: Absent uterus. MUSCULOSKELETAL: Posterior spinal fusion hardware at L4-L5 with grade 1 anterolisthesis of L4 on L5. No acute bony abnormality or destructive bone lesions.     IMPRESSION: 1.  Mild proctitis of the mid and lower rectum. No other definite sites of  active large bowel inflammation. 2.  Distal colonic diverticulosis. No inflamed colonic diverticuli. 3.  Fluid throughout the proximal colon, compatible with the reported diarrhea.             I, Chandni Fernandez, am serving as a scribe to document services personally performed by Ismael Pierre MD, based on my observation and the provider's statements to me. I, Ismael Pierre MD attest that Chandni Fernandez is acting in a scribe capacity, has observed my performance of the services and has documented them in accordance with my direction.    Ismael Pierre M.D.  Emergency Medicine  [facility] EMERGENCY ROOM     Ismael Pierre MD  10/02/23 5315

## 2023-10-04 LAB — BACTERIA UR CULT: NORMAL

## 2023-10-17 ENCOUNTER — LAB REQUISITION (OUTPATIENT)
Dept: LAB | Facility: CLINIC | Age: 68
End: 2023-10-17

## 2023-10-17 DIAGNOSIS — K52.9 NONINFECTIVE GASTROENTERITIS AND COLITIS, UNSPECIFIED: ICD-10-CM

## 2023-10-17 PROCEDURE — 87209 SMEAR COMPLEX STAIN: CPT | Performed by: NURSE PRACTITIONER

## 2023-10-17 PROCEDURE — 87507 IADNA-DNA/RNA PROBE TQ 12-25: CPT | Performed by: NURSE PRACTITIONER

## 2023-10-18 LAB

## 2023-11-04 ENCOUNTER — ANESTHESIA EVENT (OUTPATIENT)
Dept: SURGERY | Facility: CLINIC | Age: 68
End: 2023-11-04
Payer: COMMERCIAL

## 2023-11-04 ENCOUNTER — APPOINTMENT (OUTPATIENT)
Dept: ULTRASOUND IMAGING | Facility: CLINIC | Age: 68
End: 2023-11-04
Attending: EMERGENCY MEDICINE
Payer: COMMERCIAL

## 2023-11-04 ENCOUNTER — HOSPITAL ENCOUNTER (OUTPATIENT)
Facility: CLINIC | Age: 68
Discharge: HOME | End: 2023-11-04
Attending: EMERGENCY MEDICINE | Admitting: EMERGENCY MEDICINE
Payer: COMMERCIAL

## 2023-11-04 ENCOUNTER — ANESTHESIA (OUTPATIENT)
Dept: SURGERY | Facility: CLINIC | Age: 68
End: 2023-11-04
Payer: COMMERCIAL

## 2023-11-04 ENCOUNTER — HOSPITAL ENCOUNTER (OUTPATIENT)
Facility: CLINIC | Age: 68
End: 2023-11-04
Attending: SURGERY | Admitting: SURGERY
Payer: COMMERCIAL

## 2023-11-04 VITALS
DIASTOLIC BLOOD PRESSURE: 81 MMHG | HEART RATE: 86 BPM | TEMPERATURE: 97.1 F | SYSTOLIC BLOOD PRESSURE: 170 MMHG | OXYGEN SATURATION: 95 % | RESPIRATION RATE: 29 BRPM | HEIGHT: 56 IN | BODY MASS INDEX: 40.49 KG/M2 | WEIGHT: 180 LBS

## 2023-11-04 DIAGNOSIS — K81.9 CHOLECYSTITIS: ICD-10-CM

## 2023-11-04 LAB
ALBUMIN SERPL BCG-MCNC: 4 G/DL (ref 3.5–5.2)
ALBUMIN UR-MCNC: 10 MG/DL
ALP SERPL-CCNC: 166 U/L (ref 35–104)
ALT SERPL W P-5'-P-CCNC: 71 U/L (ref 0–50)
ANION GAP SERPL CALCULATED.3IONS-SCNC: 11 MMOL/L (ref 7–15)
APPEARANCE UR: CLEAR
AST SERPL W P-5'-P-CCNC: 144 U/L (ref 0–45)
ATRIAL RATE - MUSE: 67 BPM
BILIRUB DIRECT SERPL-MCNC: 0.42 MG/DL (ref 0–0.3)
BILIRUB SERPL-MCNC: 0.7 MG/DL
BILIRUB UR QL STRIP: NEGATIVE
BUN SERPL-MCNC: 12.6 MG/DL (ref 8–23)
CALCIUM SERPL-MCNC: 9.3 MG/DL (ref 8.8–10.2)
CHLORIDE SERPL-SCNC: 100 MMOL/L (ref 98–107)
COLOR UR AUTO: YELLOW
CREAT SERPL-MCNC: 0.7 MG/DL (ref 0.51–0.95)
DEPRECATED HCO3 PLAS-SCNC: 29 MMOL/L (ref 22–29)
DIASTOLIC BLOOD PRESSURE - MUSE: NORMAL MMHG
EGFRCR SERPLBLD CKD-EPI 2021: >90 ML/MIN/1.73M2
ERYTHROCYTE [DISTWIDTH] IN BLOOD BY AUTOMATED COUNT: 16.4 % (ref 10–15)
GLUCOSE SERPL-MCNC: 112 MG/DL (ref 70–99)
GLUCOSE UR STRIP-MCNC: NEGATIVE MG/DL
HCT VFR BLD AUTO: 36.8 % (ref 35–47)
HGB BLD-MCNC: 11.5 G/DL (ref 11.7–15.7)
HGB UR QL STRIP: NEGATIVE
HYALINE CASTS: 1 /LPF
INTERPRETATION ECG - MUSE: NORMAL
KETONES UR STRIP-MCNC: NEGATIVE MG/DL
LEUKOCYTE ESTERASE UR QL STRIP: ABNORMAL
LIPASE SERPL-CCNC: 44 U/L (ref 13–60)
MCH RBC QN AUTO: 25.3 PG (ref 26.5–33)
MCHC RBC AUTO-ENTMCNC: 31.3 G/DL (ref 31.5–36.5)
MCV RBC AUTO: 81 FL (ref 78–100)
MUCOUS THREADS #/AREA URNS LPF: PRESENT /LPF
NITRATE UR QL: NEGATIVE
P AXIS - MUSE: 16 DEGREES
PH UR STRIP: 6 [PH] (ref 5–7)
PLATELET # BLD AUTO: 331 10E3/UL (ref 150–450)
POTASSIUM SERPL-SCNC: 3.5 MMOL/L (ref 3.4–5.3)
PR INTERVAL - MUSE: 162 MS
PROT SERPL-MCNC: 7.3 G/DL (ref 6.4–8.3)
QRS DURATION - MUSE: 78 MS
QT - MUSE: 432 MS
QTC - MUSE: 456 MS
R AXIS - MUSE: -22 DEGREES
RBC # BLD AUTO: 4.54 10E6/UL (ref 3.8–5.2)
RBC URINE: <1 /HPF
SODIUM SERPL-SCNC: 140 MMOL/L (ref 135–145)
SP GR UR STRIP: 1.02 (ref 1–1.03)
SQUAMOUS EPITHELIAL: 1 /HPF
SYSTOLIC BLOOD PRESSURE - MUSE: NORMAL MMHG
T AXIS - MUSE: 69 DEGREES
TROPONIN T SERPL HS-MCNC: 7 NG/L
UROBILINOGEN UR STRIP-MCNC: <2 MG/DL
VENTRICULAR RATE- MUSE: 67 BPM
WBC # BLD AUTO: 6.6 10E3/UL (ref 4–11)
WBC URINE: 3 /HPF

## 2023-11-04 PROCEDURE — 258N000003 HC RX IP 258 OP 636: Performed by: NURSE ANESTHETIST, CERTIFIED REGISTERED

## 2023-11-04 PROCEDURE — 999N000141 HC STATISTIC PRE-PROCEDURE NURSING ASSESSMENT: Performed by: SURGERY

## 2023-11-04 PROCEDURE — 370N000017 HC ANESTHESIA TECHNICAL FEE, PER MIN: Performed by: SURGERY

## 2023-11-04 PROCEDURE — 250N000009 HC RX 250: Performed by: NURSE ANESTHETIST, CERTIFIED REGISTERED

## 2023-11-04 PROCEDURE — 96374 THER/PROPH/DIAG INJ IV PUSH: CPT | Mod: 59

## 2023-11-04 PROCEDURE — 76705 ECHO EXAM OF ABDOMEN: CPT

## 2023-11-04 PROCEDURE — 96375 TX/PRO/DX INJ NEW DRUG ADDON: CPT

## 2023-11-04 PROCEDURE — 250N000011 HC RX IP 250 OP 636: Performed by: EMERGENCY MEDICINE

## 2023-11-04 PROCEDURE — 80053 COMPREHEN METABOLIC PANEL: CPT | Performed by: EMERGENCY MEDICINE

## 2023-11-04 PROCEDURE — 272N000001 HC OR GENERAL SUPPLY STERILE: Performed by: SURGERY

## 2023-11-04 PROCEDURE — 250N000011 HC RX IP 250 OP 636: Performed by: SURGERY

## 2023-11-04 PROCEDURE — 47562 LAPAROSCOPIC CHOLECYSTECTOMY: CPT | Performed by: SURGERY

## 2023-11-04 PROCEDURE — 250N000011 HC RX IP 250 OP 636: Mod: JZ | Performed by: NURSE ANESTHETIST, CERTIFIED REGISTERED

## 2023-11-04 PROCEDURE — 93005 ELECTROCARDIOGRAM TRACING: CPT | Mod: XU | Performed by: EMERGENCY MEDICINE

## 2023-11-04 PROCEDURE — 99285 EMERGENCY DEPT VISIT HI MDM: CPT | Mod: 25

## 2023-11-04 PROCEDURE — 81001 URINALYSIS AUTO W/SCOPE: CPT | Performed by: EMERGENCY MEDICINE

## 2023-11-04 PROCEDURE — 710N000010 HC RECOVERY PHASE 1, LEVEL 2, PER MIN: Performed by: SURGERY

## 2023-11-04 PROCEDURE — 84484 ASSAY OF TROPONIN QUANT: CPT | Performed by: EMERGENCY MEDICINE

## 2023-11-04 PROCEDURE — 250N000011 HC RX IP 250 OP 636: Mod: JZ | Performed by: ANESTHESIOLOGY

## 2023-11-04 PROCEDURE — 250N000009 HC RX 250: Performed by: SURGERY

## 2023-11-04 PROCEDURE — 710N000012 HC RECOVERY PHASE 2, PER MINUTE: Performed by: SURGERY

## 2023-11-04 PROCEDURE — 88304 TISSUE EXAM BY PATHOLOGIST: CPT | Mod: TC | Performed by: SURGERY

## 2023-11-04 PROCEDURE — 36415 COLL VENOUS BLD VENIPUNCTURE: CPT | Performed by: EMERGENCY MEDICINE

## 2023-11-04 PROCEDURE — 83690 ASSAY OF LIPASE: CPT | Performed by: EMERGENCY MEDICINE

## 2023-11-04 PROCEDURE — 85027 COMPLETE CBC AUTOMATED: CPT | Performed by: EMERGENCY MEDICINE

## 2023-11-04 PROCEDURE — 99204 OFFICE O/P NEW MOD 45 MIN: CPT | Mod: 57 | Performed by: SURGERY

## 2023-11-04 PROCEDURE — 250N000025 HC SEVOFLURANE, PER MIN: Performed by: SURGERY

## 2023-11-04 PROCEDURE — 82248 BILIRUBIN DIRECT: CPT | Performed by: EMERGENCY MEDICINE

## 2023-11-04 PROCEDURE — 360N000076 HC SURGERY LEVEL 3, PER MIN: Performed by: SURGERY

## 2023-11-04 PROCEDURE — 250N000013 HC RX MED GY IP 250 OP 250 PS 637: Performed by: ANESTHESIOLOGY

## 2023-11-04 RX ORDER — HYDROMORPHONE HCL IN WATER/PF 6 MG/30 ML
0.2 PATIENT CONTROLLED ANALGESIA SYRINGE INTRAVENOUS EVERY 5 MIN PRN
Status: DISCONTINUED | OUTPATIENT
Start: 2023-11-04 | End: 2023-11-04 | Stop reason: HOSPADM

## 2023-11-04 RX ORDER — OXYCODONE HYDROCHLORIDE 5 MG/1
5 TABLET ORAL
Status: CANCELLED | OUTPATIENT
Start: 2023-11-04

## 2023-11-04 RX ORDER — PIPERACILLIN SODIUM, TAZOBACTAM SODIUM 3; .375 G/15ML; G/15ML
3.38 INJECTION, POWDER, LYOPHILIZED, FOR SOLUTION INTRAVENOUS ONCE
Status: COMPLETED | OUTPATIENT
Start: 2023-11-04 | End: 2023-11-04

## 2023-11-04 RX ORDER — FENTANYL CITRATE 50 UG/ML
25 INJECTION, SOLUTION INTRAMUSCULAR; INTRAVENOUS EVERY 5 MIN PRN
Status: DISCONTINUED | OUTPATIENT
Start: 2023-11-04 | End: 2023-11-04 | Stop reason: HOSPADM

## 2023-11-04 RX ORDER — FENTANYL CITRATE 50 UG/ML
50 INJECTION, SOLUTION INTRAMUSCULAR; INTRAVENOUS EVERY 5 MIN PRN
Status: DISCONTINUED | OUTPATIENT
Start: 2023-11-04 | End: 2023-11-04 | Stop reason: HOSPADM

## 2023-11-04 RX ORDER — ONDANSETRON 2 MG/ML
4 INJECTION INTRAMUSCULAR; INTRAVENOUS EVERY 30 MIN PRN
Status: DISCONTINUED | OUTPATIENT
Start: 2023-11-04 | End: 2023-11-04 | Stop reason: HOSPADM

## 2023-11-04 RX ORDER — BUPIVACAINE HYDROCHLORIDE 2.5 MG/ML
INJECTION, SOLUTION INFILTRATION; PERINEURAL PRN
Status: DISCONTINUED | OUTPATIENT
Start: 2023-11-04 | End: 2023-11-04 | Stop reason: HOSPADM

## 2023-11-04 RX ORDER — HYDROMORPHONE HCL IN WATER/PF 6 MG/30 ML
0.4 PATIENT CONTROLLED ANALGESIA SYRINGE INTRAVENOUS EVERY 5 MIN PRN
Status: DISCONTINUED | OUTPATIENT
Start: 2023-11-04 | End: 2023-11-04 | Stop reason: HOSPADM

## 2023-11-04 RX ORDER — LIDOCAINE 40 MG/G
CREAM TOPICAL
Status: DISCONTINUED | OUTPATIENT
Start: 2023-11-04 | End: 2023-11-04 | Stop reason: HOSPADM

## 2023-11-04 RX ORDER — HALOPERIDOL 5 MG/ML
1 INJECTION INTRAMUSCULAR
Status: DISCONTINUED | OUTPATIENT
Start: 2023-11-04 | End: 2023-11-04 | Stop reason: HOSPADM

## 2023-11-04 RX ORDER — ONDANSETRON 4 MG/1
4 TABLET, ORALLY DISINTEGRATING ORAL EVERY 30 MIN PRN
Status: DISCONTINUED | OUTPATIENT
Start: 2023-11-04 | End: 2023-11-04 | Stop reason: HOSPADM

## 2023-11-04 RX ORDER — SODIUM CHLORIDE, SODIUM LACTATE, POTASSIUM CHLORIDE, CALCIUM CHLORIDE 600; 310; 30; 20 MG/100ML; MG/100ML; MG/100ML; MG/100ML
INJECTION, SOLUTION INTRAVENOUS CONTINUOUS
Status: DISCONTINUED | OUTPATIENT
Start: 2023-11-04 | End: 2023-11-04 | Stop reason: HOSPADM

## 2023-11-04 RX ORDER — ONDANSETRON 2 MG/ML
4 INJECTION INTRAMUSCULAR; INTRAVENOUS ONCE
Status: COMPLETED | OUTPATIENT
Start: 2023-11-04 | End: 2023-11-04

## 2023-11-04 RX ORDER — HYDROCHLOROTHIAZIDE 12.5 MG/1
12.5 CAPSULE ORAL ONCE
Status: COMPLETED | OUTPATIENT
Start: 2023-11-04 | End: 2023-11-04

## 2023-11-04 RX ORDER — PROPOFOL 10 MG/ML
INJECTION, EMULSION INTRAVENOUS PRN
Status: DISCONTINUED | OUTPATIENT
Start: 2023-11-04 | End: 2023-11-04

## 2023-11-04 RX ORDER — FENTANYL CITRATE 50 UG/ML
INJECTION, SOLUTION INTRAMUSCULAR; INTRAVENOUS PRN
Status: DISCONTINUED | OUTPATIENT
Start: 2023-11-04 | End: 2023-11-04

## 2023-11-04 RX ORDER — CEFAZOLIN SODIUM/WATER 2 G/20 ML
2 SYRINGE (ML) INTRAVENOUS SEE ADMIN INSTRUCTIONS
Status: DISCONTINUED | OUTPATIENT
Start: 2023-11-04 | End: 2023-11-04 | Stop reason: HOSPADM

## 2023-11-04 RX ORDER — LIDOCAINE HYDROCHLORIDE 10 MG/ML
INJECTION, SOLUTION INFILTRATION; PERINEURAL PRN
Status: DISCONTINUED | OUTPATIENT
Start: 2023-11-04 | End: 2023-11-04

## 2023-11-04 RX ORDER — HYDRALAZINE HYDROCHLORIDE 20 MG/ML
5 INJECTION INTRAMUSCULAR; INTRAVENOUS EVERY 10 MIN PRN
Status: DISCONTINUED | OUTPATIENT
Start: 2023-11-04 | End: 2023-11-04 | Stop reason: HOSPADM

## 2023-11-04 RX ORDER — SODIUM CHLORIDE, SODIUM LACTATE, POTASSIUM CHLORIDE, CALCIUM CHLORIDE 600; 310; 30; 20 MG/100ML; MG/100ML; MG/100ML; MG/100ML
INJECTION, SOLUTION INTRAVENOUS CONTINUOUS PRN
Status: DISCONTINUED | OUTPATIENT
Start: 2023-11-04 | End: 2023-11-04

## 2023-11-04 RX ORDER — TRAMADOL HYDROCHLORIDE 50 MG/1
50 TABLET ORAL EVERY 6 HOURS PRN
Qty: 10 TABLET | Refills: 0 | Status: SHIPPED | OUTPATIENT
Start: 2023-11-04 | End: 2023-11-07

## 2023-11-04 RX ORDER — DEXAMETHASONE SODIUM PHOSPHATE 10 MG/ML
INJECTION, SOLUTION INTRAMUSCULAR; INTRAVENOUS PRN
Status: DISCONTINUED | OUTPATIENT
Start: 2023-11-04 | End: 2023-11-04

## 2023-11-04 RX ORDER — MORPHINE SULFATE 4 MG/ML
4 INJECTION, SOLUTION INTRAMUSCULAR; INTRAVENOUS ONCE
Status: COMPLETED | OUTPATIENT
Start: 2023-11-04 | End: 2023-11-04

## 2023-11-04 RX ORDER — ONDANSETRON 2 MG/ML
INJECTION INTRAMUSCULAR; INTRAVENOUS PRN
Status: DISCONTINUED | OUTPATIENT
Start: 2023-11-04 | End: 2023-11-04

## 2023-11-04 RX ORDER — CEFAZOLIN SODIUM/WATER 2 G/20 ML
2 SYRINGE (ML) INTRAVENOUS
Status: DISCONTINUED | OUTPATIENT
Start: 2023-11-04 | End: 2023-11-04 | Stop reason: HOSPADM

## 2023-11-04 RX ORDER — ACETAMINOPHEN 325 MG/1
975 TABLET ORAL ONCE
Status: DISCONTINUED | OUTPATIENT
Start: 2023-11-04 | End: 2023-11-04 | Stop reason: HOSPADM

## 2023-11-04 RX ADMIN — SODIUM CHLORIDE, POTASSIUM CHLORIDE, SODIUM LACTATE AND CALCIUM CHLORIDE: 600; 310; 30; 20 INJECTION, SOLUTION INTRAVENOUS at 15:23

## 2023-11-04 RX ADMIN — ROCURONIUM BROMIDE 50 MG: 10 INJECTION, SOLUTION INTRAVENOUS at 15:28

## 2023-11-04 RX ADMIN — ONDANSETRON 4 MG: 2 INJECTION INTRAMUSCULAR; INTRAVENOUS at 15:28

## 2023-11-04 RX ADMIN — PIPERACILLIN AND TAZOBACTAM 3.38 G: 3; .375 INJECTION, POWDER, LYOPHILIZED, FOR SOLUTION INTRAVENOUS at 14:09

## 2023-11-04 RX ADMIN — HYDROCHLOROTHIAZIDE 12.5 MG: 12.5 CAPSULE ORAL at 16:46

## 2023-11-04 RX ADMIN — LIDOCAINE HYDROCHLORIDE 50 MG: 10 INJECTION, SOLUTION INFILTRATION; PERINEURAL at 15:28

## 2023-11-04 RX ADMIN — HYDROMORPHONE HYDROCHLORIDE 1 MG: 1 INJECTION, SOLUTION INTRAMUSCULAR; INTRAVENOUS; SUBCUTANEOUS at 15:56

## 2023-11-04 RX ADMIN — FENTANYL CITRATE 100 MCG: 50 INJECTION INTRAMUSCULAR; INTRAVENOUS at 15:28

## 2023-11-04 RX ADMIN — PROPOFOL 150 MG: 10 INJECTION, EMULSION INTRAVENOUS at 15:28

## 2023-11-04 RX ADMIN — MORPHINE SULFATE 4 MG: 4 INJECTION, SOLUTION INTRAMUSCULAR; INTRAVENOUS at 12:00

## 2023-11-04 RX ADMIN — DEXAMETHASONE SODIUM PHOSPHATE 10 MG: 10 INJECTION, SOLUTION INTRAMUSCULAR; INTRAVENOUS at 15:28

## 2023-11-04 RX ADMIN — ONDANSETRON 4 MG: 2 INJECTION INTRAMUSCULAR; INTRAVENOUS at 11:59

## 2023-11-04 RX ADMIN — HYDRALAZINE HYDROCHLORIDE 5 MG: 20 INJECTION, SOLUTION INTRAMUSCULAR; INTRAVENOUS at 17:01

## 2023-11-04 ASSESSMENT — LIFESTYLE VARIABLES: TOBACCO_USE: 1

## 2023-11-04 ASSESSMENT — ACTIVITIES OF DAILY LIVING (ADL)
ADLS_ACUITY_SCORE: 35

## 2023-11-04 NOTE — H&P
General Surgery Consultation  Arlene Becker MRN# [id number]   Age/Sex: 68 year old female YOB: 1955     Reason for consult: 1. Cholecystitis            Requesting physician: Cristo Romero MD                   Assessment and Plan:   Assessment:  Acute cholecystitis with cholelithiasis.  Discussed with the patient the plan for laparoscopic cholecystectomy, risks and benefits of surgery and her dissipated perioperative course.  She wishes to proceed.    Plan:  1.  Laparoscopic cholecystectomy          Chief Complaint:     Chief Complaint   Patient presents with    Abdominal Pain        History is obtained from the patient    HPI:   Arlene Becker is a 68 year old female who presents through the [facility] emergency department with right upper quadrant abdominal pain since yesterday afternoon.  She notes that she has had similar pain to this in the past, but never as severe and never as long-lasting.  This current course initiated yesterday evening following dinner, were then temporarily resolved overnight.  He returned with exuberance in the early morning hours and was quite severe.  On presentation to the emergency department she had some mild elevation of liver enzymes with an ultrasound consistent with acute cholecystitis.          Past Medical History:   History reviewed. No pertinent past medical history.           Past Surgical History:     Past Surgical History:   Procedure Laterality Date     SECTION      HYSTERECTOMY      RELEASE CARPAL TUNNEL      TEMPOROMANDIBULAR JOINT ARTHROPLASTY               Social History:    reports that she quit smoking about 10 years ago. She does not have any smokeless tobacco history on file.           Family History:     Family History   Problem Relation Age of Onset    Coronary Artery Disease Mother     Coronary Artery Disease Father     Coronary Artery Disease Brother         stent              Allergies:     Allergies   Allergen Reactions    Sulfa  (Sulfonamide Antibiotics) [Sulfa Antibiotics] Hives              Medications:     Prior to Admission medications    Medication Sig Start Date End Date Taking? Authorizing Provider   budesonide (ENTOCORT EC) 3 mg 24 hr capsule [BUDESONIDE (ENTOCORT EC) 3 MG 24 HR CAPSULE] Take 3 mg by mouth every morning. 1/26/15   Provider, Historical   calcium-vitamin D (CALCIUM-VITAMIN D) 500 mg(1,250mg) -200 unit per tablet [CALCIUM-VITAMIN D (CALCIUM-VITAMIN D) 500 MG(1,250MG) -200 UNIT PER TABLET] Take 2 tablets by mouth daily. 10/11/19   Provider, Historical   cyanocobalamin, vitamin B-12, 1,000 mcg/15 mL Liqd [CYANOCOBALAMIN, VITAMIN B-12, 1,000 MCG/15 ML LIQD] Take 1,000 mg by mouth daily. 1/26/15   Provider, Historical   esomeprazole (NEXIUM) 20 MG capsule [ESOMEPRAZOLE (NEXIUM) 20 MG CAPSULE] Take 20 mg by mouth every morning before breakfast. 1/26/15   Provider, Historical   hydroCHLOROthiazide (HYDRODIURIL) 12.5 MG tablet [HYDROCHLOROTHIAZIDE (HYDRODIURIL) 12.5 MG TABLET] Take 12.5 mg by mouth 2 (two) times a day at 9am and 6pm. 10/11/19   Provider, Historical   pravastatin (PRAVACHOL) 20 MG tablet [PRAVASTATIN (PRAVACHOL) 20 MG TABLET] Take 20 mg by mouth at bedtime. 10/11/19   Provider, Historical   rosuvastatin (CRESTOR) 5 MG tablet [ROSUVASTATIN (CRESTOR) 5 MG TABLET] Take 5 mg by mouth bedtime. 2/3/15   Provider, Historical   spironolactone (ALDACTONE) 25 MG tablet [SPIRONOLACTONE (ALDACTONE) 25 MG TABLET] Take 25 mg by mouth daily. 1/26/15   Provider, Historical   venlafaxine (EFFEXOR-XR) 75 MG 24 hr capsule [VENLAFAXINE (EFFEXOR-XR) 75 MG 24 HR CAPSULE] Take 75 mg by mouth daily. 1/26/15   Provider, Historical              Review of Systems:   The Review of Systems is negative other than noted in the HPI            Physical Exam:   Patient Vitals for the past 24 hrs:   BP Temp Temp src Pulse Resp SpO2 Height Weight   11/04/23 1345 111/74 -- -- 75 -- 92 % -- --   11/04/23 1200 124/78 -- -- 66 (!) 32 96 % -- --  "  11/04/23 1115 (!) 147/76 97.4  F (36.3  C) Temporal 76 20 93 % 1.422 m (4' 8\") 81.6 kg (180 lb)        No intake or output data in the 24 hours ending 11/04/23 1520   Constitutional:   awake, alert, cooperative, no apparent distress, and appears stated age       Eyes:   PERRL, conjunctiva/corneas clear, EOM's intact; no scleral edema or icterus noted        ENT:   Normocephalic, without obvious abnormality, atraumatic, Lips, mucosa, and tongue normal          Lungs:   Normal respiratory effort, no accessory muscle use       Cardiovascular:   Regular rate and rhythm       Abdomen:   Soft, tender to palpation in the right upper quadrant       Musculoskeletal:   No obvious swelling, bruising or deformity       Skin:   Skin color and texture normal for patient, no rashes or lesions              Data:         All imaging studies reviewed by me.    Results for orders placed or performed during the hospital encounter of 11/04/23 (from the past 24 hour(s))   CBC (+ platelets, no diff)   Result Value Ref Range    WBC Count 6.6 4.0 - 11.0 10e3/uL    RBC Count 4.54 3.80 - 5.20 10e6/uL    Hemoglobin 11.5 (L) 11.7 - 15.7 g/dL    Hematocrit 36.8 35.0 - 47.0 %    MCV 81 78 - 100 fL    MCH 25.3 (L) 26.5 - 33.0 pg    MCHC 31.3 (L) 31.5 - 36.5 g/dL    RDW 16.4 (H) 10.0 - 15.0 %    Platelet Count 331 150 - 450 10e3/uL   Basic metabolic panel   Result Value Ref Range    Sodium 140 135 - 145 mmol/L    Potassium 3.5 3.4 - 5.3 mmol/L    Chloride 100 98 - 107 mmol/L    Carbon Dioxide (CO2) 29 22 - 29 mmol/L    Anion Gap 11 7 - 15 mmol/L    Urea Nitrogen 12.6 8.0 - 23.0 mg/dL    Creatinine 0.70 0.51 - 0.95 mg/dL    GFR Estimate >90 >60 mL/min/1.73m2    Calcium 9.3 8.8 - 10.2 mg/dL    Glucose 112 (H) 70 - 99 mg/dL   Hepatic panel   Result Value Ref Range    Protein Total 7.3 6.4 - 8.3 g/dL    Albumin 4.0 3.5 - 5.2 g/dL    Bilirubin Total 0.7 <=1.2 mg/dL    Alkaline Phosphatase 166 (H) 35 - 104 U/L     (H) 0 - 45 U/L    ALT 71 (H) 0 " - 50 U/L    Bilirubin Direct 0.42 (H) 0.00 - 0.30 mg/dL   Lipase   Result Value Ref Range    Lipase 44 13 - 60 U/L   Troponin T, High Sensitivity (now)   Result Value Ref Range    Troponin T, High Sensitivity 7 <=14 ng/L   Abdomen US, limited (RUQ only)    Narrative    EXAM: US ABDOMEN LIMITED  LOCATION: [facility]  DATE: 11/4/2023    INDICATION: Right upper quadrant abdominal pain with tenderness and guarding.  COMPARISON: CT abdomen pelvis 10/02/2023.  TECHNIQUE: Limited abdominal ultrasound.    FINDINGS:    GALLBLADDER: Gallbladder is mildly distended. There are multiple gallstones in the gallbladder neck measuring up to 1.1 cm. Gallbladder wall is mildly thickened up to 5 mm. No pericholecystic fluid. The sonographer reports a positive sonographic Ebcker's   sign.    BILE DUCTS: No biliary dilatation. The common duct measures 6 mm.    LIVER: Normal parenchyma with smooth contour. No focal mass. Main portal vein is patent with hepatopedal flow.    RIGHT KIDNEY: No hydronephrosis.    PANCREAS: Visualized portions of the head, neck, and body are normal.    No ascites.      Impression    IMPRESSION:    1.  Findings suspicious for acute calculus cholecystitis.   UA with Microscopic reflex to Culture    Specimen: Urine, Midstream   Result Value Ref Range    Color Urine Yellow Colorless, Straw, Light Yellow, Yellow    Appearance Urine Clear Clear    Glucose Urine Negative Negative mg/dL    Bilirubin Urine Negative Negative    Ketones Urine Negative Negative mg/dL    Specific Gravity Urine 1.018 1.001 - 1.030    Blood Urine Negative Negative    pH Urine 6.0 5.0 - 7.0    Protein Albumin Urine 10 (A) Negative mg/dL    Urobilinogen Urine <2.0 <2.0 mg/dL    Nitrite Urine Negative Negative    Leukocyte Esterase Urine 25 Yeison/uL (A) Negative    Mucus Urine Present (A) None Seen /LPF    RBC Urine <1 <=2 /HPF    WBC Urine 3 <=5 /HPF    Squamous Epithelials Urine 1 <=1 /HPF    Hyaline Casts Urine 1 <=2  /LPF    Narrative    Urine Culture not indicated        Perfecto Major MD

## 2023-11-04 NOTE — ED PROVIDER NOTES
EMERGENCY DEPARTMENT ENCOUNTER      NAME: Arlene Becker  AGE: 68 year old female  YOB: 1955  MRN: [id number]  EVALUATION DATE & TIME: 11/4/2023 11:19 AM    PCP: Vivian Aranda    ED PROVIDER: Cathy Fernandes PA-C      Chief Complaint   Patient presents with    Abdominal Pain         FINAL IMPRESSION:  1. Cholecystitis      MEDICAL DECISION MAKING:    Pertinent Labs & Imaging studies reviewed. (See chart for details)  68 year old female presents to the Emergency Department for evaluation of abdominal pain.  The patient had right upper quadrant abdominal pain last night that resolved and then woke her up this morning.  Pain was significant and she was having associated nausea and was concerned about her symptoms so she presents to the emergency department.  On my evaluation, the patient was initially hypertensive at 147/76 but otherwise vitally stable.  Examination with heart regular rate and rhythm and lungs clear to auscultation bilaterally.  Abdomen soft with significant tenderness to palpation in the right upper quadrant with positive Becker sign.  Patient was having some guarding with this but no rebound.  No other significant abdominal tenderness to palpation.  Differential diagnosis included cholecystitis, cholangitis, symptomatic choledocholithiasis, pancreatitis, gastritis.    Patient declined having any chest pain or difficulty breathing however, with symptoms of upper abdominal pain I did feel troponin and EKG were indicated.  EKG normal sinus rhythm without any ST or T wave changes concerning for ACS and initial troponin negative at 7.  Patient has been having symptoms ongoing for the past 6 to 7 hours and with reassuring troponin and EKG without any chest pain I do not feel ACS is likely cause of her symptoms especially with pinpoint tenderness in the right upper quadrant with positive Becker sign.  CBC without any significant derangement including a normal white blood cell count.  BMP  without any significant derangement.  LFTs with elevated alk phos of 166, AST of 144, ALT of 71 and bilirubin elevated slightly at 0.42.  Lipase normal at 44.  UA without any signs of infection.  At this time, with focal tenderness to palpation in the right upper quadrant I did not feel CT of the abdomen pelvis was indicated at this time.  Ultrasound showed mild distention of the gallbladder, multiple gallstones and gallbladder thickening of 5 mm without any pericholecystic fluid.  Normal biliary dilation and common bile duct measures 6 mm.  Ending suspicious for acute calculus cholecystitis.  Patient's pain significantly improved with morphine here and I discussed results and need for surgery.  Patient agreement understanding with the plan of care.  Patient given a dose of Zosyn and after discussion with Dr. Major he will plan for surgical intervention later today.  Patient appears well with reassuring laboratory evaluation and vital signs and plan will be for discharge home after or intervention.  This was discussed with patient she was agreement understanding.  All questions were answered the best my ability.  Patient will remain n.p.o. here in the emergency department until transferred to the OR for cholecystectomy.    Medical Decision Making    History:  Supplemental history from: Documented in chart, if applicable  External Record(s) reviewed: Documented in chart, if applicable.    Work Up:  Chart documentation includes differential considered and any EKGs or imaging independently interpreted by provider, where specified.  In additional to work up documented, I considered the following work up: Documented in chart, if applicable.    External consultation:  Discussion of management with another provider: General Surgery    Complicating factors:  Care impacted by chronic illness: Hyperlipidemia, Hypertension, and Mental Health  Care affected by social determinants of health: Access to Medical Care    Disposition  considerations:  Admit to surgery with plan for discharge home after surgical intervention.         ED COURSE:  11:30 AM I met with the patient, obtained history, performed an initial exam, and discussed options and plan for diagnostics and treatment here in the ED.    12:56 PM I staffed the patient with Cristo Castaneda MD.     1:10 PM I discussed results with the patient and need for surgical intervention.  Antibiotics ordered.  Pain controlled at this time.    1:38 PM I discussed the case with Dr. Major from general surgery who feels that the patient is appropriate for surgical intervention with cholecystectomy today and likely discharge home from the OR.  He stated that I should not talk with hospitalist at this time as plan is for discharge after surgery.    At the conclusion of the encounter I discussed the results of all of the tests and the disposition. The questions were answered. The patient or family acknowledged understanding and was agreeable with the care plan.     MEDICATIONS GIVEN IN THE EMERGENCY:  Medications   piperacillin-tazobactam (ZOSYN) 3.375 g vial to attach to  mL bag (has no administration in time range)   morphine (PF) injection 4 mg (4 mg Intravenous $Given 11/4/23 1200)   ondansetron (ZOFRAN) injection 4 mg (4 mg Intravenous $Given 11/4/23 1159)       NEW PRESCRIPTIONS STARTED AT TODAY'S ER VISIT  New Prescriptions    No medications on file            =================================================================    HPI:    Patient information was obtained from: The patient    Use of Interpretor: N/A        Arlene Becker is a 68 year old female with a pertinent history of hypertension, anxiety, hyperlipidemia who presents to this ED via private vehicle for evaluation of abdominal pain.  Last night the patient started to have some right upper quadrant abdominal pain that improved however, early this morning she was suddenly woken up with severe right upper quadrant pain that did  not resolve.  She was concerned about her symptoms and presented to the emergency department.  On my evaluation, the patient reports nausea but no vomiting.  She denies any fevers or chills.  She denies any lower abdominal pain, diarrhea, urinary symptoms.  She denies any chest pain or shortness of breath.  No other concerns voiced at this time.      REVIEW OF SYSTEMS:  Negative unless otherwise stated in the above HPI.    PAST MEDICAL HISTORY:  History reviewed. No pertinent past medical history.    PAST SURGICAL HISTORY:  Past Surgical History:   Procedure Laterality Date     SECTION      HYSTERECTOMY      RELEASE CARPAL TUNNEL      TEMPOROMANDIBULAR JOINT ARTHROPLASTY             CURRENT MEDICATIONS:      Current Facility-Administered Medications:     piperacillin-tazobactam (ZOSYN) 3.375 g vial to attach to  mL bag, 3.375 g, Intravenous, Once, Cathy Fernandes PA-C    Current Outpatient Medications:     budesonide (ENTOCORT EC) 3 mg 24 hr capsule, [BUDESONIDE (ENTOCORT EC) 3 MG 24 HR CAPSULE] Take 3 mg by mouth every morning., Disp: , Rfl:     calcium-vitamin D (CALCIUM-VITAMIN D) 500 mg(1,250mg) -200 unit per tablet, [CALCIUM-VITAMIN D (CALCIUM-VITAMIN D) 500 MG(1,250MG) -200 UNIT PER TABLET] Take 2 tablets by mouth daily., Disp: , Rfl:     cyanocobalamin, vitamin B-12, 1,000 mcg/15 mL Liqd, [CYANOCOBALAMIN, VITAMIN B-12, 1,000 MCG/15 ML LIQD] Take 1,000 mg by mouth daily., Disp: , Rfl:     esomeprazole (NEXIUM) 20 MG capsule, [ESOMEPRAZOLE (NEXIUM) 20 MG CAPSULE] Take 20 mg by mouth every morning before breakfast., Disp: , Rfl:     hydroCHLOROthiazide (HYDRODIURIL) 12.5 MG tablet, [HYDROCHLOROTHIAZIDE (HYDRODIURIL) 12.5 MG TABLET] Take 12.5 mg by mouth 2 (two) times a day at 9am and 6pm., Disp: , Rfl:     pravastatin (PRAVACHOL) 20 MG tablet, [PRAVASTATIN (PRAVACHOL) 20 MG TABLET] Take 20 mg by mouth at bedtime., Disp: , Rfl:     rosuvastatin (CRESTOR) 5 MG tablet, [ROSUVASTATIN (CRESTOR) 5 MG  "TABLET] Take 5 mg by mouth bedtime., Disp: , Rfl:     spironolactone (ALDACTONE) 25 MG tablet, [SPIRONOLACTONE (ALDACTONE) 25 MG TABLET] Take 25 mg by mouth daily., Disp: , Rfl:     venlafaxine (EFFEXOR-XR) 75 MG 24 hr capsule, [VENLAFAXINE (EFFEXOR-XR) 75 MG 24 HR CAPSULE] Take 75 mg by mouth daily., Disp: , Rfl:       ALLERGIES:  Allergies   Allergen Reactions    Sulfa (Sulfonamide Antibiotics) [Sulfa Antibiotics] Hives       FAMILY HISTORY:  Family History   Problem Relation Age of Onset    Coronary Artery Disease Mother     Coronary Artery Disease Father     Coronary Artery Disease Brother         stent       SOCIAL HISTORY:   Social History     Socioeconomic History    Marital status:      Spouse name: None    Number of children: None    Years of education: None    Highest education level: None   Tobacco Use    Smoking status: Former     Types: Cigarettes     Quit date: 5/1/2013     Years since quitting: 10.5       VITALS:  Patient Vitals for the past 24 hrs:   BP Temp Temp src Pulse Resp SpO2 Height Weight   11/04/23 1200 124/78 -- -- 66 (!) 32 96 % -- --   11/04/23 1115 (!) 147/76 97.4  F (36.3  C) Temporal 76 20 93 % 1.422 m (4' 8\") 81.6 kg (180 lb)       PHYSICAL EXAM    Constitutional: Well developed, Well nourished, NAD  HENT: Normocephalic, Atraumatic, Bilateral external ears normal, Oropharynx normal, mucous membranes moist, Nose normal.   Neck: Normal range of motion, No tenderness, Supple, No stridor.  Eyes: Eyes track normally throughout exam, Conjunctiva normal, No discharge.   Respiratory: Normal breath sounds, No respiratory distress, No wheezing, Speaks full sentences easily. No cough.  Cardiovascular: Normal heart rate, Regular rhythm, No murmurs, No rubs, No gallops. Chest wall nontender.  GI: Soft, tenderness to palpation of the right upper quadrant with guarding, no rebound. No other abdominal tenderness to palpation.   Musculoskeletal: Good range of motion in all major joints. "   Integument: Warm, Dry, No erythema, No rash. No petechiae.  Neurologic: Alert & oriented x 3, Normal motor function, Normal sensory function, No focal deficits noted. Normal gait.  Psychiatric: Affect normal, Judgment normal, Mood normal. Cooperative.    LAB:  All pertinent labs reviewed and interpreted.  Recent Results (from the past 24 hour(s))   CBC (+ platelets, no diff)    Collection Time: 11/04/23 11:39 AM   Result Value Ref Range    WBC Count 6.6 4.0 - 11.0 10e3/uL    RBC Count 4.54 3.80 - 5.20 10e6/uL    Hemoglobin 11.5 (L) 11.7 - 15.7 g/dL    Hematocrit 36.8 35.0 - 47.0 %    MCV 81 78 - 100 fL    MCH 25.3 (L) 26.5 - 33.0 pg    MCHC 31.3 (L) 31.5 - 36.5 g/dL    RDW 16.4 (H) 10.0 - 15.0 %    Platelet Count 331 150 - 450 10e3/uL   Basic metabolic panel    Collection Time: 11/04/23 11:39 AM   Result Value Ref Range    Sodium 140 135 - 145 mmol/L    Potassium 3.5 3.4 - 5.3 mmol/L    Chloride 100 98 - 107 mmol/L    Carbon Dioxide (CO2) 29 22 - 29 mmol/L    Anion Gap 11 7 - 15 mmol/L    Urea Nitrogen 12.6 8.0 - 23.0 mg/dL    Creatinine 0.70 0.51 - 0.95 mg/dL    GFR Estimate >90 >60 mL/min/1.73m2    Calcium 9.3 8.8 - 10.2 mg/dL    Glucose 112 (H) 70 - 99 mg/dL   Hepatic panel    Collection Time: 11/04/23 11:39 AM   Result Value Ref Range    Protein Total 7.3 6.4 - 8.3 g/dL    Albumin 4.0 3.5 - 5.2 g/dL    Bilirubin Total 0.7 <=1.2 mg/dL    Alkaline Phosphatase 166 (H) 35 - 104 U/L     (H) 0 - 45 U/L    ALT 71 (H) 0 - 50 U/L    Bilirubin Direct 0.42 (H) 0.00 - 0.30 mg/dL   Lipase    Collection Time: 11/04/23 11:39 AM   Result Value Ref Range    Lipase 44 13 - 60 U/L   Troponin T, High Sensitivity (now)    Collection Time: 11/04/23 11:39 AM   Result Value Ref Range    Troponin T, High Sensitivity 7 <=14 ng/L   UA with Microscopic reflex to Culture    Collection Time: 11/04/23 12:53 PM    Specimen: Urine, Midstream   Result Value Ref Range    Color Urine Yellow Colorless, Straw, Light Yellow, Yellow     Appearance Urine Clear Clear    Glucose Urine Negative Negative mg/dL    Bilirubin Urine Negative Negative    Ketones Urine Negative Negative mg/dL    Specific Gravity Urine 1.018 1.001 - 1.030    Blood Urine Negative Negative    pH Urine 6.0 5.0 - 7.0    Protein Albumin Urine 10 (A) Negative mg/dL    Urobilinogen Urine <2.0 <2.0 mg/dL    Nitrite Urine Negative Negative    Leukocyte Esterase Urine 25 Yeison/uL (A) Negative    Mucus Urine Present (A) None Seen /LPF    RBC Urine <1 <=2 /HPF    WBC Urine 3 <=5 /HPF    Squamous Epithelials Urine 1 <=1 /HPF    Hyaline Casts Urine 1 <=2 /LPF         RADIOLOGY:  Reviewed all pertinent imaging. Please see official radiology report.  Abdomen US, limited (RUQ only)   Final Result   IMPRESSION:      1.  Findings suspicious for acute calculus cholecystitis.          PROCEDURES:   None.     Cathy Fernandes PA-C  Emergency Medicine  [facility]  11/4/2023      Cathy Fernandes PA-C  11/04/23 1405

## 2023-11-04 NOTE — ANESTHESIA PROCEDURE NOTES
Airway       Patient location during procedure: OR       Procedure Start/Stop Times: 11/4/2023 3:29 PM  Staff -        Anesthesiologist:  Hernando Samuel MD       CRNA: Chai Grossman APRN CRNA       Performed By: CRNA  Consent for Airway        Urgency: elective  Indications and Patient Condition       Indications for airway management: raven-procedural       Induction type:intravenous       Mask difficulty assessment: 1 - vent by mask    Final Airway Details       Final airway type: endotracheal airway       Successful airway: ETT - single  Endotracheal Airway Details        ETT size (mm): 7.5       Successful intubation technique: video laryngoscopy       VL Blade Size: Glidescope 3       Grade View of Cords: 1       Position: Right       Measured from: lips       Secured at (cm): 22       Bite block used: None    Post intubation assessment        Placement verified by: capnometry, equal breath sounds and chest rise        Number of attempts at approach: 3       Number of other approaches attempted: 0       Secured with: silk tape       Ease of procedure: easy       Dentition: Intact and Unchanged    Medication(s) Administered   Medication Administration Time: 11/4/2023 3:29 PM    Additional Comments       Zero view with Ramirez 2, easy Glidescope intubation per MDA.

## 2023-11-04 NOTE — OP NOTE
[facility]  Operative Note    Pre-operative diagnosis: Cholecystitis [K81.9]   Post-operative diagnosis acute cholecystitis   Procedure: Procedure(s):  CHOLECYSTECTOMY, LAPAROSCOPIC   Surgeon: Perfecto Major MD   Assistants(s): none   Anesthesia: General Anesthetic    Estimated blood loss: 20 mL                Drains: None   Specimens: Gallbladder       Findings: None.   Complications: None.           Description of procedure:      The patient was brought to the operating room where after induction of general anesthesia with endotracheal and the patient was positioned with the left arm tucked and right arm out.  The patient was then prepped and draped in standard sterile fashion.  After a procedural pause we began by injecting quarter percent Marcaine into the skin immediately adjacent to the umbilicus.  This was then sharply incised.  We then entered with a 5 mm optical trochar.  The patient was then placed in reverse Trendelenburg and right side up the body positioning.  We then proceeded to place 3 additional trochars across the right subcostal margin.      On initial evaluation the gallbladder it appeared distended and edematous.   We began our operation by grasping the fundus the gallbladder and retracting it cephalad over the dome of the liver.  The neck of the gallbladder was then retracted lateral to the right side.  We then began by scoring the peritoneum overlying the triangle of Calot. Using primarily blunt dissection and electrocautery we proceeded to clear the fatty tissues and lymph node away from the triangle of Calot. The cystic duct and cystic artery were skeletonized. A critical view was obtained.  We then proceeded to place three clips each on the cystic artery and duct, which were then divided.  We then utilized electrocautery to dissect the remainder of the gallbladder off the gallbladder fossa. Once this was completely removed we inspected the gallbladder fossa for  hemostasis which appeared excellent. The gallbladder was then placed into an Endo Catch bag. All spilled fluid and blood were then aspirated clear from the right upper quadrant and after confirming no active bleeding from the gallbladder fossa the Endo Catch bag with the gallbladder intact was removed through the 12 mm port site. An 0 Vicryl stitch was then placed under laparoscopic guidance in the 12 mm port site.  We then desufflated the abdomen and removed our ports. The preplaced fascial tie was then tied down with excellent obliteration of the fascial defect. The remainder of the local anesthetic was then injected in the skin and fascia surrounding the port sites and the skin closed with running 4-0 subcuticular sutures      Perfecto Major MD, FACS  Office: [phone]  [facility]   General and Bariatric Surgery

## 2023-11-04 NOTE — ANESTHESIA PREPROCEDURE EVALUATION
Anesthesia Pre-Procedure Evaluation    Patient: Arlene Becker   MRN: [id number] : 1955        Procedure : Procedure(s):  CHOLECYSTECTOMY, LAPAROSCOPIC          History reviewed. No pertinent past medical history.   Past Surgical History:   Procedure Laterality Date     SECTION      HYSTERECTOMY      RELEASE CARPAL TUNNEL      TEMPOROMANDIBULAR JOINT ARTHROPLASTY        Allergies   Allergen Reactions    Sulfa (Sulfonamide Antibiotics) [Sulfa Antibiotics] Hives      Social History     Tobacco Use    Smoking status: Former     Types: Cigarettes     Quit date: 2013     Years since quitting: 10.5    Smokeless tobacco: Not on file   Substance Use Topics    Alcohol use: Not on file      Wt Readings from Last 1 Encounters:   23 81.6 kg (180 lb)        Anesthesia Evaluation   Pt has had prior anesthetic. Type: General.    History of anesthetic complications  - PONV.      ROS/MED HX  ENT/Pulmonary:  - neg pulmonary ROS   (+) sleep apnea, doesn't use CPAP,              tobacco use, Past use,                      Neurologic:  - neg neurologic ROS     Cardiovascular:  - neg cardiovascular ROS   (+) Dyslipidemia - -   -  - -                                      METS/Exercise Tolerance: >4 METS    Hematologic:  - neg hematologic  ROS     Musculoskeletal:  - neg musculoskeletal ROS     GI/Hepatic:  - neg GI/hepatic ROS   (+) GERD,         cholecystitis/cholelithiasis,          Renal/Genitourinary:  - neg Renal ROS     Endo:  - neg endo ROS   (+)               Obesity (bmi 40),       Psychiatric/Substance Use:  - neg psychiatric ROS     Infectious Disease:  - neg infectious disease ROS     Malignancy:  - neg malignancy ROS     Other:  - neg other ROS          Physical Exam    Airway        Mallampati: II   TM distance: > 3 FB   Neck ROM: full   Mouth opening: > 3 cm    Respiratory Devices and Support         Dental       (+) Minor Abnormalities - some fillings, tiny chips      Cardiovascular    "cardiovascular exam normal          Pulmonary   pulmonary exam normal                OUTSIDE LABS:  CBC:   Lab Results   Component Value Date    WBC 6.6 11/04/2023    WBC 5.0 10/02/2023    HGB 11.5 (L) 11/04/2023    HGB 12.2 10/02/2023    HCT 36.8 11/04/2023    HCT 38.0 10/02/2023     11/04/2023     10/02/2023     BMP:   Lab Results   Component Value Date     11/04/2023     10/02/2023    POTASSIUM 3.5 11/04/2023    POTASSIUM 3.4 10/02/2023    CHLORIDE 100 11/04/2023    CHLORIDE 97 (L) 10/02/2023    CO2 29 11/04/2023    CO2 29 10/02/2023    BUN 12.6 11/04/2023    BUN 8.7 10/02/2023    CR 0.70 11/04/2023    CR 0.74 10/02/2023     (H) 11/04/2023    GLC 93 10/02/2023     COAGS: No results found for: \"PTT\", \"INR\", \"FIBR\"  POC: No results found for: \"BGM\", \"HCG\", \"HCGS\"  HEPATIC:   Lab Results   Component Value Date    ALBUMIN 4.0 11/04/2023    PROTTOTAL 7.3 11/04/2023    ALT 71 (H) 11/04/2023     (H) 11/04/2023    ALKPHOS 166 (H) 11/04/2023    BILITOTAL 0.7 11/04/2023     OTHER:   Lab Results   Component Value Date    LACT 1.2 10/02/2023    JELLY 9.3 11/04/2023    MAG 1.8 10/02/2023    LIPASE 44 11/04/2023    TSH 4.25 (H) 03/07/2023    T4 1.31 03/07/2023       Anesthesia Plan    ASA Status:  3, emergent    NPO Status:  NPO Appropriate    Anesthesia Type: General.     - Airway: ETT   Induction: Propofol, Intravenous.   Maintenance: Balanced.        Consents    Anesthesia Plan(s) and associated risks, benefits, and realistic alternatives discussed. Questions answered and patient/representative(s) expressed understanding.     - Discussed:     - Discussed with:  Patient            Postoperative Care    Pain management: Multi-modal analgesia.   PONV prophylaxis: Ondansetron (or other 5HT-3), Dexamethasone or Solumedrol     Comments:    Other Comments: Reviewed anesthetic options and risks, including risk of dental trauma. Patient agrees to proceed.             Hernando Samuel MD  "

## 2023-11-04 NOTE — ANESTHESIA POSTPROCEDURE EVALUATION
Patient: Arlene Becker    Procedure: Procedure(s):  CHOLECYSTECTOMY, LAPAROSCOPIC       Anesthesia Type:  General    Note:  Disposition: Inpatient   Postop Pain Control: Uneventful            Sign Out: Well controlled pain   PONV: No   Neuro/Psych: Uneventful            Sign Out: Acceptable/Baseline neuro status   Airway/Respiratory: Uneventful            Sign Out: Acceptable/Baseline resp. status   CV/Hemodynamics: Uneventful            Sign Out: Acceptable CV status; No obvious hypovolemia; No obvious fluid overload   Other NRE: NONE   DID A NON-ROUTINE EVENT OCCUR? No           Last vitals:  Vitals Value Taken Time   /79 11/04/23 1720   Temp 36.4  C (97.6  F) 11/04/23 1621   Pulse 86 11/04/23 1720   Resp 29 11/04/23 1720   SpO2 95 % 11/04/23 1720       Electronically Signed By: Hernando Samuel MD  November 4, 2023  5:33 PM

## 2023-11-04 NOTE — ED PROVIDER NOTES
"Emergency Department Midlevel Supervisory Note     I personally saw the patient and performed a substantive portion of the visit including all aspects of the medical decision making.    ED Course:  12:57 PM Cathy Fernandes PA-C staffed patient with me. I agree with their assessment and plan of management, and I will see the patient.  1:15 PM I met with the patient to introduce myself, gather additional history, perform my initial exam, and discuss the plan.     Brief HPI:     Arlene Becker is a 68 year old female who presents for evaluation of abdominal pain. RUQ abdominal pain started ~1 day ago. Denies nausea.    The time of my assessment patient reporting that she is feeling improved she is status post some pain medications which seem to have helped her discomfort but she has very focal tenderness to the right upper quadrant if she presses in that area per her report.  Please see ADRIANO note for further details.    Brief Physical Exam: /78   Pulse 66   Temp 97.4  F (36.3  C) (Temporal)   Resp (!) 32   Ht 1.422 m (4' 8\")   Wt 81.6 kg (180 lb)   SpO2 96%   BMI 40.36 kg/m    Constitutional:  Alert, in no acute distress   EYES: Conjunctivae clear  HENT:  Atraumatic, normocephalic  Respiratory:  Respirations even, unlabored, in no acute respiratory distress  Cardiovascular:  Regular rate and rhythm, good peripheral perfusion  GI: Right upper quadrant pain  Musculoskeletal:  No edema. No cyanosis. Range of motion major extremities intact.    Integument: Warm, Dry, No erythema, No rash.   Neurologic:  Alert & oriented, no focal deficits noted  Psych: Normal mood and affect     MDM:  Patient with very focal right upper quadrant pain.  Work-up in the emergency department consistent with acute cholecystitis.  Bile duct normal via ultrasound.  Mild elevation to LFTs.  Patient administered antibiotics.  Case discussed with general surgery.  Plan for operative intervention.  Please see ADRIANO note for further " details.       1. Cholecystitis        Labs and Imaging:  Results for orders placed or performed during the hospital encounter of 11/04/23   Abdomen US, limited (RUQ only)    Impression    IMPRESSION:    1.  Findings suspicious for acute calculus cholecystitis.   CBC (+ platelets, no diff)   Result Value Ref Range    WBC Count 6.6 4.0 - 11.0 10e3/uL    RBC Count 4.54 3.80 - 5.20 10e6/uL    Hemoglobin 11.5 (L) 11.7 - 15.7 g/dL    Hematocrit 36.8 35.0 - 47.0 %    MCV 81 78 - 100 fL    MCH 25.3 (L) 26.5 - 33.0 pg    MCHC 31.3 (L) 31.5 - 36.5 g/dL    RDW 16.4 (H) 10.0 - 15.0 %    Platelet Count 331 150 - 450 10e3/uL   Basic metabolic panel   Result Value Ref Range    Sodium 140 135 - 145 mmol/L    Potassium 3.5 3.4 - 5.3 mmol/L    Chloride 100 98 - 107 mmol/L    Carbon Dioxide (CO2) 29 22 - 29 mmol/L    Anion Gap 11 7 - 15 mmol/L    Urea Nitrogen 12.6 8.0 - 23.0 mg/dL    Creatinine 0.70 0.51 - 0.95 mg/dL    GFR Estimate >90 >60 mL/min/1.73m2    Calcium 9.3 8.8 - 10.2 mg/dL    Glucose 112 (H) 70 - 99 mg/dL   Hepatic panel   Result Value Ref Range    Protein Total 7.3 6.4 - 8.3 g/dL    Albumin 4.0 3.5 - 5.2 g/dL    Bilirubin Total 0.7 <=1.2 mg/dL    Alkaline Phosphatase 166 (H) 35 - 104 U/L     (H) 0 - 45 U/L    ALT 71 (H) 0 - 50 U/L    Bilirubin Direct 0.42 (H) 0.00 - 0.30 mg/dL   Result Value Ref Range    Lipase 44 13 - 60 U/L   UA with Microscopic reflex to Culture    Specimen: Urine, Midstream   Result Value Ref Range    Color Urine Yellow Colorless, Straw, Light Yellow, Yellow    Appearance Urine Clear Clear    Glucose Urine Negative Negative mg/dL    Bilirubin Urine Negative Negative    Ketones Urine Negative Negative mg/dL    Specific Gravity Urine 1.018 1.001 - 1.030    Blood Urine Negative Negative    pH Urine 6.0 5.0 - 7.0    Protein Albumin Urine 10 (A) Negative mg/dL    Urobilinogen Urine <2.0 <2.0 mg/dL    Nitrite Urine Negative Negative    Leukocyte Esterase Urine 25 Yeison/uL (A) Negative    Mucus Urine  Present (A) None Seen /LPF    RBC Urine <1 <=2 /HPF    WBC Urine 3 <=5 /HPF    Squamous Epithelials Urine 1 <=1 /HPF    Hyaline Casts Urine 1 <=2 /LPF   Troponin T, High Sensitivity (now)   Result Value Ref Range    Troponin T, High Sensitivity 7 <=14 ng/L     I have reviewed the relevant laboratory and radiology studies    EKG:  Performed at: 1157    Impression: sinus rhythm, moderate voltage criteria for LVH may be normal variant, borderline EKG    Rate: 67  Rhythm: sinus  Axis: -22  WV Interval: 162  QRS Interval: 78  QTc Interval: 456  ST Changes: none  Comparison: when compared to EKG from 5/16/2016, no change found    I have independently reviewed and interpreted the EKG(s) documented above.    Procedures:  I was present for the key portions of this procedure: none      I, Nereyda Jonna, am serving as a scribe to document services personally performed by Cristo Castaneda MD based on my observation and the provider's statements to me. I, Cristo Castaneda MD, attest that Nereyda Coyle is acting in a scribe capacity, has observed my performance of the services and has documented them in accordance with my direction.    Cristo Castaneda MD  [facility] EMERGENCY ROOM  [address] [address] [phone]       Cristo Castaneda MD  11/04/23 0359

## 2023-11-04 NOTE — ED TRIAGE NOTES
Pt c/o RUQ abdomen pain that started yesterday. The pain went away last night and this morning she woke up with the pain again. No med PTA. Does say she feel nauseated. Only hx of hysterectomy, no other major abdomen surgeries.      Triage Assessment (Adult)       Row Name 11/04/23 1117          Triage Assessment    Airway WDL WDL        Respiratory WDL    Respiratory WDL WDL        Skin Circulation/Temperature WDL    Skin Circulation/Temperature WDL WDL        Cardiac WDL    Cardiac WDL WDL        Peripheral/Neurovascular WDL    Peripheral Neurovascular WDL WDL        Cognitive/Neuro/Behavioral WDL    Cognitive/Neuro/Behavioral WDL WDL

## 2023-11-04 NOTE — ANESTHESIA CARE TRANSFER NOTE
Patient: Arlene Becker    Procedure: Procedure(s):  CHOLECYSTECTOMY, LAPAROSCOPIC       Diagnosis: Cholecystitis [K81.9]  Diagnosis Additional Information: No value filed.    Anesthesia Type:   General     Note:    Oropharynx: oropharynx clear of all foreign objects  Level of Consciousness: awake  Oxygen Supplementation: face mask  Level of Supplemental Oxygen (L/min / FiO2): 6  Independent Airway: airway patency satisfactory and stable  Dentition: dentition unchanged  Vital Signs Stable: post-procedure vital signs reviewed and stable  Report to RN Given: handoff report given  Patient transferred to: PACU    Handoff Report: Identifed the Patient, Identified the Reponsible Provider, Reviewed the pertinent medical history, Discussed the surgical course, Reviewed Intra-OP anesthesia mangement and issues during anesthesia, Set expectations for post-procedure period and Allowed opportunity for questions and acknowledgement of understanding      Vitals:  Vitals Value Taken Time   /103 11/04/23 1623   Temp     Pulse 90 11/04/23 1624   Resp 14 11/04/23 1624   SpO2 97 % 11/04/23 1624   Vitals shown include unfiled device data.    Electronically Signed By: RAISSA Benavides CRNA  November 4, 2023  4:25 PM

## 2023-11-07 LAB
PATH REPORT.COMMENTS IMP SPEC: NORMAL
PATH REPORT.COMMENTS IMP SPEC: NORMAL
PATH REPORT.FINAL DX SPEC: NORMAL
PATH REPORT.GROSS SPEC: NORMAL
PATH REPORT.MICROSCOPIC SPEC OTHER STN: NORMAL
PATH REPORT.RELEVANT HX SPEC: NORMAL
PHOTO IMAGE: NORMAL

## 2023-11-07 PROCEDURE — 88304 TISSUE EXAM BY PATHOLOGIST: CPT | Mod: 26 | Performed by: PATHOLOGY

## 2023-11-09 ENCOUNTER — TELEPHONE (OUTPATIENT)
Dept: SURGERY | Facility: CLINIC | Age: 68
End: 2023-11-09
Payer: COMMERCIAL

## 2023-11-09 DIAGNOSIS — Z98.890 POSTOPERATIVE NAUSEA: Primary | ICD-10-CM

## 2023-11-09 DIAGNOSIS — R11.0 POSTOPERATIVE NAUSEA: Primary | ICD-10-CM

## 2023-11-09 RX ORDER — ONDANSETRON 4 MG/1
4 TABLET, ORALLY DISINTEGRATING ORAL EVERY 8 HOURS PRN
Qty: 8 TABLET | Refills: 0 | Status: SHIPPED | OUTPATIENT
Start: 2023-11-09

## 2023-11-09 NOTE — TELEPHONE ENCOUNTER
Patient had surgery last Saturday and having issues with nausea asking if she can get a prescription to help with that.    Thanks!

## 2023-11-09 NOTE — TELEPHONE ENCOUNTER
[facility] Post-Op Phone Call                     Surgeon: Perfecto Major MD    Date of Surgery: Laparoscopic Cholecystectomy 11/04/2023  Discharge Date: 11/04/2023    Date/Time Called:   Date: 11/9/2023 Time: 11:07 AM   Attempt: First    Pain Control:  Intensity: Mild (1 - 3)  Duration/Location/Explain: Upper epigastric incision  What makes it better/worse? Tylenol    Medications:  Narcotic Use - No    Incisions:  Drainage? clean and dry  Any fever type symptoms? No    GI:  Nausea? Yes  Vomiting? No  BM? Yes  Gas? Yes  Voiding Frequency? 4 or more/day   Appetite? Fair    Activity:  Walking activity? Yes  Frequency/Type: Multiple times throughout the day.  Restrictions: x 1 day      Post-op appointment made? No    Patient is doing well after surgery. She does complain of nausea. She is eating a bland diet. We discussed diet after surgery. Answered all questions. Will send Zofran in to her pharmacy. Encouraged her to call with any other concerns. She expressed understanding.       [facility]      Marimar Fairchild RN  [facility]  General Surgery  [facility] [address]  [email or web address]   Office:[phone]  Employed by [facility],

## 2024-04-11 ENCOUNTER — LAB REQUISITION (OUTPATIENT)
Dept: LAB | Facility: CLINIC | Age: 69
End: 2024-04-11

## 2024-04-11 DIAGNOSIS — I10 ESSENTIAL (PRIMARY) HYPERTENSION: ICD-10-CM

## 2024-04-11 DIAGNOSIS — R53.83 OTHER FATIGUE: ICD-10-CM

## 2024-04-11 DIAGNOSIS — E78.5 HYPERLIPIDEMIA, UNSPECIFIED: ICD-10-CM

## 2024-04-11 PROCEDURE — 84450 TRANSFERASE (AST) (SGOT): CPT | Performed by: FAMILY MEDICINE

## 2024-04-11 PROCEDURE — 84478 ASSAY OF TRIGLYCERIDES: CPT | Performed by: FAMILY MEDICINE

## 2024-04-11 PROCEDURE — 84439 ASSAY OF FREE THYROXINE: CPT | Performed by: FAMILY MEDICINE

## 2024-04-11 PROCEDURE — 82247 BILIRUBIN TOTAL: CPT | Performed by: FAMILY MEDICINE

## 2024-04-11 PROCEDURE — 84443 ASSAY THYROID STIM HORMONE: CPT | Performed by: FAMILY MEDICINE

## 2024-04-12 LAB
ALBUMIN SERPL BCG-MCNC: 4.3 G/DL (ref 3.5–5.2)
ALP SERPL-CCNC: 121 U/L (ref 40–150)
ALT SERPL W P-5'-P-CCNC: 8 U/L (ref 0–50)
ANION GAP SERPL CALCULATED.3IONS-SCNC: 14 MMOL/L (ref 7–15)
AST SERPL W P-5'-P-CCNC: 22 U/L (ref 0–45)
BILIRUB SERPL-MCNC: 0.2 MG/DL
BUN SERPL-MCNC: 12.7 MG/DL (ref 8–23)
CALCIUM SERPL-MCNC: 9.3 MG/DL (ref 8.8–10.2)
CHLORIDE SERPL-SCNC: 98 MMOL/L (ref 98–107)
CHOLEST SERPL-MCNC: 195 MG/DL
CREAT SERPL-MCNC: 0.74 MG/DL (ref 0.51–0.95)
DEPRECATED HCO3 PLAS-SCNC: 24 MMOL/L (ref 22–29)
EGFRCR SERPLBLD CKD-EPI 2021: 86 ML/MIN/1.73M2
FASTING STATUS PATIENT QL REPORTED: ABNORMAL
GLUCOSE SERPL-MCNC: 106 MG/DL (ref 70–99)
HDLC SERPL-MCNC: 61 MG/DL
LDLC SERPL CALC-MCNC: 114 MG/DL
NONHDLC SERPL-MCNC: 134 MG/DL
POTASSIUM SERPL-SCNC: 3 MMOL/L (ref 3.4–5.3)
PROT SERPL-MCNC: 7.5 G/DL (ref 6.4–8.3)
SODIUM SERPL-SCNC: 136 MMOL/L (ref 135–145)
T4 FREE SERPL-MCNC: 1.18 NG/DL (ref 0.9–1.7)
TRIGL SERPL-MCNC: 100 MG/DL
TSH SERPL DL<=0.005 MIU/L-ACNC: 4.71 UIU/ML (ref 0.3–4.2)

## 2024-06-03 ENCOUNTER — LAB REQUISITION (OUTPATIENT)
Dept: LAB | Facility: CLINIC | Age: 69
End: 2024-06-03

## 2024-06-03 DIAGNOSIS — L75.0 BROMHIDROSIS: ICD-10-CM

## 2024-06-03 PROCEDURE — 87186 SC STD MICRODIL/AGAR DIL: CPT | Performed by: FAMILY MEDICINE

## 2024-06-03 PROCEDURE — 87086 URINE CULTURE/COLONY COUNT: CPT | Performed by: FAMILY MEDICINE

## 2024-06-05 LAB — BACTERIA UR CULT: ABNORMAL

## 2024-06-11 ENCOUNTER — LAB REQUISITION (OUTPATIENT)
Dept: LAB | Facility: CLINIC | Age: 69
End: 2024-06-11

## 2024-06-11 DIAGNOSIS — I10 ESSENTIAL (PRIMARY) HYPERTENSION: ICD-10-CM

## 2024-06-11 LAB
ALBUMIN SERPL BCG-MCNC: 4.3 G/DL (ref 3.5–5.2)
ALP SERPL-CCNC: 105 U/L (ref 40–150)
ALT SERPL W P-5'-P-CCNC: 16 U/L (ref 0–50)
ANION GAP SERPL CALCULATED.3IONS-SCNC: 14 MMOL/L (ref 7–15)
AST SERPL W P-5'-P-CCNC: 26 U/L (ref 0–45)
BASOPHILS # BLD AUTO: 0.1 10E3/UL (ref 0–0.2)
BASOPHILS NFR BLD AUTO: 1 %
BILIRUB SERPL-MCNC: 0.2 MG/DL
BUN SERPL-MCNC: 15.2 MG/DL (ref 8–23)
CALCIUM SERPL-MCNC: 9.4 MG/DL (ref 8.8–10.2)
CHLORIDE SERPL-SCNC: 97 MMOL/L (ref 98–107)
CREAT SERPL-MCNC: 0.86 MG/DL (ref 0.51–0.95)
DEPRECATED HCO3 PLAS-SCNC: 27 MMOL/L (ref 22–29)
EGFRCR SERPLBLD CKD-EPI 2021: 72 ML/MIN/1.73M2
EOSINOPHIL # BLD AUTO: 0.3 10E3/UL (ref 0–0.7)
EOSINOPHIL NFR BLD AUTO: 4 %
ERYTHROCYTE [DISTWIDTH] IN BLOOD BY AUTOMATED COUNT: 17.4 % (ref 10–15)
GLUCOSE SERPL-MCNC: 86 MG/DL (ref 70–99)
HCT VFR BLD AUTO: 38.6 % (ref 35–47)
HGB BLD-MCNC: 12.1 G/DL (ref 11.7–15.7)
IMM GRANULOCYTES # BLD: 0 10E3/UL
IMM GRANULOCYTES NFR BLD: 0 %
LYMPHOCYTES # BLD AUTO: 2.3 10E3/UL (ref 0.8–5.3)
LYMPHOCYTES NFR BLD AUTO: 30 %
MCH RBC QN AUTO: 25.5 PG (ref 26.5–33)
MCHC RBC AUTO-ENTMCNC: 31.3 G/DL (ref 31.5–36.5)
MCV RBC AUTO: 81 FL (ref 78–100)
MONOCYTES # BLD AUTO: 0.7 10E3/UL (ref 0–1.3)
MONOCYTES NFR BLD AUTO: 9 %
NEUTROPHILS # BLD AUTO: 4.3 10E3/UL (ref 1.6–8.3)
NEUTROPHILS NFR BLD AUTO: 56 %
NRBC # BLD AUTO: 0 10E3/UL
NRBC BLD AUTO-RTO: 0 /100
PLATELET # BLD AUTO: 383 10E3/UL (ref 150–450)
POTASSIUM SERPL-SCNC: 3.5 MMOL/L (ref 3.4–5.3)
PROT SERPL-MCNC: 7.5 G/DL (ref 6.4–8.3)
RBC # BLD AUTO: 4.75 10E6/UL (ref 3.8–5.2)
SODIUM SERPL-SCNC: 138 MMOL/L (ref 135–145)
WBC # BLD AUTO: 7.7 10E3/UL (ref 4–11)

## 2024-06-11 PROCEDURE — 85025 COMPLETE CBC W/AUTO DIFF WBC: CPT | Performed by: FAMILY MEDICINE

## 2024-06-11 PROCEDURE — 80053 COMPREHEN METABOLIC PANEL: CPT | Performed by: FAMILY MEDICINE

## 2024-09-17 ENCOUNTER — LAB REQUISITION (OUTPATIENT)
Dept: LAB | Facility: CLINIC | Age: 69
End: 2024-09-17

## 2024-09-17 PROCEDURE — 87086 URINE CULTURE/COLONY COUNT: CPT | Performed by: FAMILY MEDICINE

## 2024-09-19 LAB — BACTERIA UR CULT: NORMAL

## 2025-02-21 ENCOUNTER — LAB REQUISITION (OUTPATIENT)
Dept: LAB | Facility: CLINIC | Age: 70
End: 2025-02-21

## 2025-02-21 DIAGNOSIS — R30.0 DYSURIA: ICD-10-CM

## 2025-02-21 PROCEDURE — 87186 SC STD MICRODIL/AGAR DIL: CPT | Performed by: PHYSICIAN ASSISTANT

## 2025-02-22 LAB — BACTERIA UR CULT: ABNORMAL

## 2025-06-18 ENCOUNTER — LAB REQUISITION (OUTPATIENT)
Dept: LAB | Facility: CLINIC | Age: 70
End: 2025-06-18

## 2025-06-18 DIAGNOSIS — R06.02 SHORTNESS OF BREATH: ICD-10-CM

## 2025-06-18 DIAGNOSIS — M81.0 AGE-RELATED OSTEOPOROSIS WITHOUT CURRENT PATHOLOGICAL FRACTURE: ICD-10-CM

## 2025-06-18 DIAGNOSIS — E66.9 OBESITY, UNSPECIFIED: ICD-10-CM

## 2025-06-18 DIAGNOSIS — I10 ESSENTIAL (PRIMARY) HYPERTENSION: ICD-10-CM

## 2025-06-18 DIAGNOSIS — E78.5 HYPERLIPIDEMIA, UNSPECIFIED: ICD-10-CM

## 2025-06-18 LAB
ALBUMIN SERPL BCG-MCNC: 4.3 G/DL (ref 3.5–5.2)
ALP SERPL-CCNC: 95 U/L (ref 40–150)
ALT SERPL W P-5'-P-CCNC: 14 U/L (ref 0–50)
ANION GAP SERPL CALCULATED.3IONS-SCNC: 14 MMOL/L (ref 7–15)
AST SERPL W P-5'-P-CCNC: 28 U/L (ref 0–45)
BILIRUB SERPL-MCNC: 0.3 MG/DL
BUN SERPL-MCNC: 22.3 MG/DL (ref 8–23)
CALCIUM SERPL-MCNC: 9.6 MG/DL (ref 8.8–10.4)
CHLORIDE SERPL-SCNC: 96 MMOL/L (ref 98–107)
CHOLEST SERPL-MCNC: 285 MG/DL
CREAT SERPL-MCNC: 0.88 MG/DL (ref 0.51–0.95)
EGFRCR SERPLBLD CKD-EPI 2021: 69 ML/MIN/1.73M2
ERYTHROCYTE [DISTWIDTH] IN BLOOD BY AUTOMATED COUNT: 16.6 % (ref 10–15)
FASTING STATUS PATIENT QL REPORTED: YES
FASTING STATUS PATIENT QL REPORTED: YES
GLUCOSE SERPL-MCNC: 91 MG/DL (ref 70–99)
HCO3 SERPL-SCNC: 27 MMOL/L (ref 22–29)
HCT VFR BLD AUTO: 37.4 % (ref 35–47)
HDLC SERPL-MCNC: 57 MG/DL
HGB BLD-MCNC: 11.3 G/DL (ref 11.7–15.7)
LDLC SERPL CALC-MCNC: 195 MG/DL
MCH RBC QN AUTO: 24.1 PG (ref 26.5–33)
MCHC RBC AUTO-ENTMCNC: 30.2 G/DL (ref 31.5–36.5)
MCV RBC AUTO: 80 FL (ref 78–100)
NONHDLC SERPL-MCNC: 228 MG/DL
PLATELET # BLD AUTO: 381 10E3/UL (ref 150–450)
POTASSIUM SERPL-SCNC: 3.5 MMOL/L (ref 3.4–5.3)
PROT SERPL-MCNC: 7.5 G/DL (ref 6.4–8.3)
RBC # BLD AUTO: 4.69 10E6/UL (ref 3.8–5.2)
SODIUM SERPL-SCNC: 137 MMOL/L (ref 135–145)
T4 FREE SERPL-MCNC: 1.2 NG/DL (ref 0.9–1.7)
TRIGL SERPL-MCNC: 163 MG/DL
TSH SERPL DL<=0.005 MIU/L-ACNC: 7.18 UIU/ML (ref 0.3–4.2)
VIT D+METAB SERPL-MCNC: 29 NG/ML (ref 20–50)
WBC # BLD AUTO: 8.5 10E3/UL (ref 4–11)

## 2025-06-18 PROCEDURE — 80061 LIPID PANEL: CPT | Performed by: FAMILY MEDICINE

## 2025-06-18 PROCEDURE — 84439 ASSAY OF FREE THYROXINE: CPT | Performed by: FAMILY MEDICINE

## 2025-06-18 PROCEDURE — 80053 COMPREHEN METABOLIC PANEL: CPT | Performed by: FAMILY MEDICINE

## 2025-06-18 PROCEDURE — 82306 VITAMIN D 25 HYDROXY: CPT | Performed by: FAMILY MEDICINE

## 2025-06-18 PROCEDURE — 84443 ASSAY THYROID STIM HORMONE: CPT | Performed by: FAMILY MEDICINE

## 2025-06-18 PROCEDURE — 85014 HEMATOCRIT: CPT | Performed by: FAMILY MEDICINE

## 2025-07-10 ENCOUNTER — OFFICE VISIT (OUTPATIENT)
Dept: PHARMACY | Facility: PHYSICIAN GROUP | Age: 70
End: 2025-07-10
Payer: COMMERCIAL

## 2025-07-10 DIAGNOSIS — K52.9 COLITIS: ICD-10-CM

## 2025-07-10 DIAGNOSIS — F41.9 ANXIETY: Primary | ICD-10-CM

## 2025-07-10 DIAGNOSIS — G43.709 CHRONIC MIGRAINE WITHOUT AURA WITHOUT STATUS MIGRAINOSUS, NOT INTRACTABLE: ICD-10-CM

## 2025-07-10 DIAGNOSIS — I10 ESSENTIAL HYPERTENSION: ICD-10-CM

## 2025-07-10 DIAGNOSIS — E66.812 CLASS 2 SEVERE OBESITY WITH SERIOUS COMORBIDITY AND BODY MASS INDEX (BMI) OF 37.0 TO 37.9 IN ADULT, UNSPECIFIED OBESITY TYPE (H): ICD-10-CM

## 2025-07-10 DIAGNOSIS — F32.9 MAJOR DEPRESSIVE DISORDER, REMISSION STATUS UNSPECIFIED, UNSPECIFIED WHETHER RECURRENT: ICD-10-CM

## 2025-07-10 DIAGNOSIS — R60.9 EDEMA, UNSPECIFIED TYPE: ICD-10-CM

## 2025-07-10 DIAGNOSIS — R52 PAIN: ICD-10-CM

## 2025-07-10 DIAGNOSIS — E66.01 CLASS 2 SEVERE OBESITY WITH SERIOUS COMORBIDITY AND BODY MASS INDEX (BMI) OF 37.0 TO 37.9 IN ADULT, UNSPECIFIED OBESITY TYPE (H): ICD-10-CM

## 2025-07-10 DIAGNOSIS — Z78.9 TAKES DIETARY SUPPLEMENTS: ICD-10-CM

## 2025-07-10 DIAGNOSIS — G47.33 OSA (OBSTRUCTIVE SLEEP APNEA): ICD-10-CM

## 2025-07-10 DIAGNOSIS — K21.9 GASTROESOPHAGEAL REFLUX DISEASE, UNSPECIFIED WHETHER ESOPHAGITIS PRESENT: ICD-10-CM

## 2025-07-10 PROCEDURE — 3079F DIAST BP 80-89 MM HG: CPT | Performed by: PHARMACIST

## 2025-07-10 PROCEDURE — 99605 MTMS BY PHARM NP 15 MIN: CPT | Performed by: PHARMACIST

## 2025-07-10 PROCEDURE — 3077F SYST BP >= 140 MM HG: CPT | Performed by: PHARMACIST

## 2025-07-10 PROCEDURE — 99607 MTMS BY PHARM ADDL 15 MIN: CPT | Performed by: PHARMACIST

## 2025-07-10 NOTE — PATIENT INSTRUCTIONS
"Recommendations from today's MTM visit:                                                    MTM (medication therapy management) is a service provided by a clinical pharmacist designed to help you get the most of out of your medicines.      Medications:  - Increase venlfaxine to 225 mg once daily  - Start topiramate for migraines     - take 1/2 tablet (25 mg) in evening for 1 week, then 1 tablet (50 mg) at bedtime until next visit  - Continue all other medications for now    Diet/Nutrition:  - Eat SLOW- practice eating off of smaller plates/bowls, chewing to applesauce consistency, taking 20-30 minutes to eat in a calm/relaxed environment without distractions of tv/email/cell phone.  - Eat protein first. Include 15-20 gm protein at each meal, along with protein containing snack of 15-30 gm protein, to reach goal of 60-80 gm protein daily.  - Fill up on fiber. Fiber comes from plants- fruits, veggies, whole grains, nuts/seeds and beans. Aim for 25-35 grams per day by eating fiber with meals and snacks.  - Increase fluids. Drink 64 ounces of 0-calorie drinks between meals.   - Try to have a vegetable or fruit with each meal daily.  - Practice plate method: 1/2 plate lean/low fat protein source, vegetable/fruit, less than 25% of plate complex carbohydrates (\"white stuff\"- potatoes, rice, pasta, bread, etc).  - Avoid snacking unless physically hungry.   - Incorporate daily physical activity, 30-60 minutes most days of the week      Follow-up:   - Appointment Thursday, August 7 at 2:00 pm    It was great speaking with you today.  I value your experience and would be very thankful for your time in providing feedback in our clinic survey. In the next few days, you may receive an email or text message from [employer] with a link to a survey related to your clinical pharmacist.    To schedule another MTM appointment, please call [phone].    My Clinical Pharmacist's contact information:                                      "                 Please feel free to contact me with any questions or concerns you have.      Cristina Arrington, PharmD, [facility]  Medication Therapy Management Pharmacist  [facility]  [phone]

## 2025-07-10 NOTE — LETTER
July 16, 2025  Arlene Becker  [address]  [place] [address]    Dear Ms. Eugenio, [facility]     Thank you for talking with me on Jul 10, 2025 about your health and medications. As a follow-up to our conversation, I have included two documents:      Your Recommended To-Do List has steps you should take to get the best results from your medications.  Your Medication List will help you keep track of your medications and how to take them.    If you want to talk about these documents, please call Cristina Arrington PharmD at phone: [phone], Monday-Friday 8-4:30pm.    I look forward to working with you and your doctors to make sure your medications work well for you.    Sincerely,  Cristina Arrington, Francy  [facility] Pharmacist, [facility]

## 2025-07-10 NOTE — PROGRESS NOTES
Medication Therapy Management (MTM) Encounter    ASSESSMENT:                            Medication Adherence/Access  No issues identified. Difficulty with taking oral medications during IBS flare-ups. Ongoing MTM pharmacist follow-up recommended to help with medication timing and adjustments.   _  Anxiety  Depression  History since 1990s. Current treatment: venlafaxine 150mg daily. Recent stressors include loss of , friend, niece. Failed medication trials noted in chart but limited info available on this. History of pharmacogenetic testing noted in chart but unable to find results.   Patient would benefit from:  - Trial increasing venlafaxine to 225 mg daily (150 plus 75 mg cap)  - Continue counseling  - Follow up in 1 month  _  Migraines  Frequency: every 2 days, unilateral. Current treatment: triptan 4-5x/month (12/month allotted).  Patient would benefit from:  - Initiate topiramate at night for migraine prevention and weight loss benefit  - Consider neurology referral if persistent  _  Colitis and Diverticulitis  Recent exacerbation 4/1 with severe diarrhea, pain, bloody stools. Current treatment: budesonide 2-3mg capsules TID PRN, ondansetron PRN.  Patient would benefit from:  Continue current treatment  Consider new GI specialist referral  _  Hypertension  Edema  BP: 142/85 mmHg. Previous visit at goal of <130/80.   Current treatment: HCTZ, spironolactone 50mg daily.  Rationale for this regimen is not entirely clear- potentially to help with potassium balance.   Patient would benefit from:  Continue current treatment for now  Consider propranolol for anxiety/migraine/BP at follow-up  Monitor at follow-up  _  Obesity and Sleep Apnea  Significant weight gain, qualifies for insurance coverage of weight loss injectables for sleep apnea. At this time will trial oral topiramate as noted above given frequent migraines and some weight loss benefit.   Patient would benefit from:  Initiate topiramate at night  "for migraine prevention and weight loss benefit  Discuss injectable GLP-1 at next visit  Encourage exercise (start with chair exercises)  Recommend balanced diet  _  GERD  Managed with omeprazole. Failed trial: Pepcid (itching).  Patient would benefit from:  Continue current treatment  Avoid NSAIDs (exacerbate symptoms)  _  Pain  Hip pain: Tylenol extra strength 3x/day as needed.  Patient would benefit from:  Continue Tylenol  Encourage low-impact exercises  Avoid NSAIDs (exacerbate symptoms)  _  Supplements  Stable.   _____________________  PLAN:                            Medications:  - Increase venlfaxine to 225 mg once daily  - Start topiramate for migraines     - take 1/2 tablet (25 mg) in evening for 1 week, then 1 tablet (50 mg) at bedtime until next visit  - Continue all other medications for now    Diet/Nutrition:  - Eat SLOW- practice eating off of smaller plates/bowls, chewing to applesauce consistency, taking 20-30 minutes to eat in a calm/relaxed environment without distractions of tv/email/cell phone.  - Eat protein first. Include 15-20 gm protein at each meal, along with protein containing snack of 15-30 gm protein, to reach goal of 60-80 gm protein daily.  - Fill up on fiber. Fiber comes from plants- fruits, veggies, whole grains, nuts/seeds and beans. Aim for 25-35 grams per day by eating fiber with meals and snacks.  - Increase fluids. Drink 64 ounces of 0-calorie drinks between meals.   - Try to have a vegetable or fruit with each meal daily.  - Practice plate method: 1/2 plate lean/low fat protein source, vegetable/fruit, less than 25% of plate complex carbohydrates (\"white stuff\"- potatoes, rice, pasta, bread, etc).  - Avoid snacking unless physically hungry.   - Incorporate daily physical activity, 30-60 minutes most days of the week    Follow-up: Appointment Thursday, August 7 at 2:00 pm    SUBJECTIVE/OBJECTIVE:                          Arlene Becker is a 70 year old female seen for an initial " "visit. She was referred to me from Dr. Escoto.      Reason for visit: Medication review, discuss anxiety options    Allergies/ADRs: Reviewed in chart- unable to tolerate multiple statin trials in the past  Past Medical History: Reviewed in chart  Tobacco: She reports that she quit smoking about 12 years ago. She does not have any smokeless tobacco history on file.  Alcohol: none  Caffeine: 1.5 cups coffee in the morning, rare soda  Social: , lives independently with dog    Medication Adherence/Access  No issues reported. Recently switched primary providers after previous clinic closed and provider left. Occasionally misses doses medications other than venlafaxine when she has IBS flare up.     Anxiety  Depression  - Venlafaxine 150 mg once daily in the morning  Patient has been on venlafaxine since 1990s  Attempted weaning in the past  but ended up resuming.   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   Struggles with identity, memory issues, and fatigue with low motivation.   Previously tried many other medications in the past but doesn't remember what the effects or side effects were.  Several listed on her allergy list: welbutrin, paxil, mirtazapine, zoloft  History of pharmacogenetic testing noted in chart but unsure where results are.     Migraines  - Venlafaxine 150 mg once daily in the morning  - Rizatriptan 10 mg as needed  Headaches occur almost every other day, unilateral  Uses triptan 4-5 times/month (12/month allotted)  She has not followed with neurology. She is not sure venlafaxine was started to help with migraine prevention. She does not remember any other medications trials for migraine prevention.   Topiramate on her med history but not sure what the " experience or reaction was.     Colitis   - Budesonide 2-3 mg capsules three times daily as needed  - Ondansetron sublingual as needed for nausea  Recent flare-up on 4/1: severe diarrhea, pain, bloody stools  Flare-up affects medication adherence, especially diuretic, causing swelling    Hip Pain  - Tylenol Extra Strength 500 mg up to 3 times daily as needed  Ongoing hip pain    Hypertension  Edema  - Spironolactone 50 mg once daily in the morning (for water retention)  - Hydrochlorothiazide 25 mg once daily in morning  She has been on this regimen for some time. She notices fluid retention when she misses doses.     Gastroesophageal Reflux Disease   - Omeprazole 20 mg 1-2 capsules daily   Manages heartburn. Buys over the counter. She tried Pepcid as alternative but it caused itching.     Obstructive Sleep Apnea  Not currently using CPAP, has issues with fit. She sleeps a lot but isn't sure if it is restful sleep.     Weight Management  Distressed about weight.  Sedentary lifestyle  Interested in weight loss interventions  Sleep apnea may qualify for GLP-1 treatments. She has IBS but mostly diarrhea so constipation side effect would be helpful. She is open to trying oral medication as well.     Supplements  - Calcium supplement daily   - Vitamin D gummies (2/day)  No issues reported. Last DEXA in 2024. She had treatment with IV Reclast per chart- not discussed today.     Today's Vitals: BP (!) 142/85 (BP Location: Right arm, Patient Position: Sitting, Cuff Size: Adult Large)   ----------------      I spent 41 minutes with this patient today. All changes were made via collaborative practice agreement with Beulah Escoto MD.     A summary of these recommendations was given to the patient.    Cristina Arrington, PharmD, BCACP  Medication Therapy Management Pharmacist  [facility]  [phone]           Medication Therapy Recommendations  Class 2 severe obesity with serious comorbidity and body mass index (BMI)  of 37.0 to 37.9 in adult, unspecified obesity type (H)   1 Rationale: Untreated condition - Needs additional medication therapy - Indication   Recommendation: Start Medication - topiramate 50 MG tablet   Status: Accepted per CPA   Identified Date: 7/10/2025 Completed Date: 7/10/2025         Major depressive disorder, remission status unspecified, unspecified whether recurrent   1 Current Medication: venlafaxine (EFFEXOR-XR) 150 MG 24 hr capsule   Current Medication Sig: Take 75 mg by mouth daily. (Along with 150 mg for total of 225 mg daily)   Rationale: Dose too low - Dosage too low - Effectiveness   Recommendation: Increase Dose - venlafaxine 75 MG 24 hr capsule   Status: Accepted per CPA   Identified Date: 7/10/2025 Completed Date: 7/10/2025

## 2025-07-10 NOTE — LETTER
_  Medication List        Prepared on: Jul 10, 2025     Bring your Medication List when you go to the doctor, hospital, or   emergency room. And, share it with your family or caregivers.     Note any changes to how you take your medications.  Cross out medications when you no longer use them.    Medication How I take it Why I use it Prescriber   acetaminophen (TYLENOL) tablet 500 mg  Take 1 tablet (500 mg) by mouth three times daily as needed. Pain Patient Reported   budesonide (ENTOCORT EC) 3 MG EC capsule Take 6 mg by mouth daily.  Colitis  Beulah Esocto MD   hydroCHLOROthiazide tablet 25 mg  Take 1 tablet (25 mg) by mouth daily. High Blood Pressure; Fluid Retention Beulah Escoto MD   spironolactone (ALDACTONE) tablet 50 mg  Take 1 tablet (50 mg) by mouth daily. High Blood Pressure; Fluid Retention Beulah Escoto MD   ondansetron (ZOFRAN ODT) ODT tab 4 mg  Allow 1 tablet (4 mg) to dissolve by mouth every 6 hours as needed for nausea Nausea Fouzia Azevedo. NP   Calcium-vitamin D 600 mg-400 unit tablet Take 1 tablet by mouth twice daily. Low Bone Density Patient Reported   Vitamin D3 1000 unit gummy Take 2 gummies (2000 units) by mouth daily. Low Bone Density Patient Reported   Omeprazole (Prilosec) OTC tablet 20 mg Take 1-2 tablet (20-40mg) by mouth daily.  Acid Reflux Patient Reported   topiramate (TOPAMAX) tablet 50 mg  Take 1/2 tablet (25 mg) by mouth at bedtime for 1 week, then increase to 1 tablet (50 mg) at bedtime. Migraine Prevention; Weight Management Beulah Escoto MD   venlafaxine (EFFEXOR XR) 24 hr capsule 150 mg Take 1 capsule by mouth once daily (along with 75 mg cap for total of 225 mg daily) Anxiety; Depression Beulah Escoto MD   venlafaxine (EFFEXOR XR) 24 hr capsule 75 mg  Take 1 capsule by mouth once daily (along with 150 mg cap for total of 225 mg daily)  Anxiety; Depression Beulah Escoto MD         Add new medications, over-the-counter drugs, herbals, vitamins, or  minerals in the blank rows  below.    Medication How I take it Why I use it Prescriber                                      Allergies:      - Atorvastatin - Other (See Comments), Muscle Pain (Myalgia)  - Indomethacin - Other (See Comments), Hives  - Nitrofurantoin - Hives  - Simvastatin - Other (See Comments), Muscle Pain (Myalgia)  - Sulfa (sulfonamide Antibiotics) [sulfa Antibiotics] - Hives        Side effects I have had:      Not on File        Other Information:              My notes and questions:

## 2025-07-10 NOTE — LETTER
"Recommended To-Do List      Prepared on: Jul 10, 2025       You can get the best results from your medications by completing the items on this \"To-Do List.\"      Bring your To-Do List when you go to your doctor. And, share it with your family or caregivers.    My To-Do List:  What we talked about: What I should do:   A new medication that may be of benefit to you    Start taking topiramate (TOPAMAX)          What we talked about: What I should do:   Your medication dosage being too low    Increase your dosage of venlafaxine (EFFEXOR XR) to 225 mg daily          What we talked about: What I should do:    What my medicines are for, how to know if my medicines are working, made sure my medicines are safe for me and reviewed how to take my medicines.      Take my medicines every day                "

## 2025-07-15 ENCOUNTER — APPOINTMENT (OUTPATIENT)
Dept: CARDIOLOGY | Facility: HOSPITAL | Age: 70
DRG: 322 | End: 2025-07-15
Attending: NURSE PRACTITIONER
Payer: COMMERCIAL

## 2025-07-15 ENCOUNTER — HOSPITAL ENCOUNTER (EMERGENCY)
Facility: CLINIC | Age: 70
Discharge: OTHER INSTITUTION | End: 2025-07-15
Attending: STUDENT IN AN ORGANIZED HEALTH CARE EDUCATION/TRAINING PROGRAM | Admitting: STUDENT IN AN ORGANIZED HEALTH CARE EDUCATION/TRAINING PROGRAM
Payer: COMMERCIAL

## 2025-07-15 ENCOUNTER — APPOINTMENT (OUTPATIENT)
Dept: CT IMAGING | Facility: CLINIC | Age: 70
End: 2025-07-15
Attending: STUDENT IN AN ORGANIZED HEALTH CARE EDUCATION/TRAINING PROGRAM
Payer: COMMERCIAL

## 2025-07-15 ENCOUNTER — HOSPITAL ENCOUNTER (INPATIENT)
Facility: HOSPITAL | Age: 70
LOS: 1 days | Discharge: HOME | End: 2025-07-16
Attending: INTERNAL MEDICINE | Admitting: INTERNAL MEDICINE
Payer: COMMERCIAL

## 2025-07-15 VITALS
HEIGHT: 56 IN | RESPIRATION RATE: 23 BRPM | WEIGHT: 170 LBS | BODY MASS INDEX: 38.24 KG/M2 | TEMPERATURE: 97.9 F | OXYGEN SATURATION: 97 % | DIASTOLIC BLOOD PRESSURE: 65 MMHG | HEART RATE: 68 BPM | SYSTOLIC BLOOD PRESSURE: 116 MMHG

## 2025-07-15 DIAGNOSIS — K21.00 GASTROESOPHAGEAL REFLUX DISEASE WITH ESOPHAGITIS, UNSPECIFIED WHETHER HEMORRHAGE: ICD-10-CM

## 2025-07-15 DIAGNOSIS — M79.602 PAIN OF LEFT UPPER EXTREMITY: ICD-10-CM

## 2025-07-15 DIAGNOSIS — R79.89 ELEVATED TROPONIN: ICD-10-CM

## 2025-07-15 DIAGNOSIS — R07.9 CHEST PAIN, UNSPECIFIED TYPE: ICD-10-CM

## 2025-07-15 DIAGNOSIS — Z95.5 S/P RIGHT CORONARY ARTERY (RCA) STENT PLACEMENT: Primary | ICD-10-CM

## 2025-07-15 DIAGNOSIS — Z95.5 S/P CORONARY ARTERY STENT PLACEMENT: Primary | ICD-10-CM

## 2025-07-15 DIAGNOSIS — I21.3 ST ELEVATION MI (STEMI) (H): ICD-10-CM

## 2025-07-15 DIAGNOSIS — I25.119 CORONARY ARTERY DISEASE INVOLVING NATIVE CORONARY ARTERY OF NATIVE HEART WITH ANGINA PECTORIS: ICD-10-CM

## 2025-07-15 DIAGNOSIS — I24.9 ACS (ACUTE CORONARY SYNDROME) (H): Primary | ICD-10-CM

## 2025-07-15 PROBLEM — Z98.61 PERCUTANEOUS TRANSLUMINAL CORONARY ANGIOPLASTY STATUS: Status: ACTIVE | Noted: 2025-07-15

## 2025-07-15 LAB
ACT BLD: 170 SECONDS (ref 74–150)
ANION GAP SERPL CALCULATED.3IONS-SCNC: 14 MMOL/L (ref 7–15)
ATRIAL RATE - MUSE: 65 BPM
ATRIAL RATE - MUSE: 75 BPM
BASOPHILS # BLD AUTO: 0.1 10E3/UL (ref 0–0.2)
BASOPHILS NFR BLD AUTO: 1 %
BUN SERPL-MCNC: 26.2 MG/DL (ref 8–23)
CALCIUM SERPL-MCNC: 9.3 MG/DL (ref 8.8–10.4)
CHLORIDE SERPL-SCNC: 97 MMOL/L (ref 98–107)
CREAT BLD-MCNC: 1.1 MG/DL (ref 0.5–1)
CREAT SERPL-MCNC: 0.92 MG/DL (ref 0.51–0.95)
DIASTOLIC BLOOD PRESSURE - MUSE: NORMAL MMHG
DIASTOLIC BLOOD PRESSURE - MUSE: NORMAL MMHG
EGFRCR SERPLBLD CKD-EPI 2021: 53 ML/MIN/1.73M2
EGFRCR SERPLBLD CKD-EPI 2021: 66 ML/MIN/1.73M2
EOSINOPHIL # BLD AUTO: 0.3 10E3/UL (ref 0–0.7)
EOSINOPHIL NFR BLD AUTO: 3 %
ERYTHROCYTE [DISTWIDTH] IN BLOOD BY AUTOMATED COUNT: 17.1 % (ref 10–15)
ERYTHROCYTE [DISTWIDTH] IN BLOOD BY AUTOMATED COUNT: 17.2 % (ref 10–15)
GLUCOSE SERPL-MCNC: 115 MG/DL (ref 70–99)
HCO3 SERPL-SCNC: 25 MMOL/L (ref 22–29)
HCT VFR BLD AUTO: 30.9 % (ref 35–47)
HCT VFR BLD AUTO: 32.9 % (ref 35–47)
HGB BLD-MCNC: 10.3 G/DL (ref 11.7–15.7)
HGB BLD-MCNC: 9.5 G/DL (ref 11.7–15.7)
HOLD SPECIMEN: NORMAL
IMM GRANULOCYTES # BLD: 0 10E3/UL
IMM GRANULOCYTES NFR BLD: 0 %
INTERPRETATION ECG - MUSE: NORMAL
INTERPRETATION ECG - MUSE: NORMAL
LVEF ECHO: NORMAL
LYMPHOCYTES # BLD AUTO: 2.7 10E3/UL (ref 0.8–5.3)
LYMPHOCYTES NFR BLD AUTO: 35 %
MCH RBC QN AUTO: 24.5 PG (ref 26.5–33)
MCH RBC QN AUTO: 24.7 PG (ref 26.5–33)
MCHC RBC AUTO-ENTMCNC: 30.7 G/DL (ref 31.5–36.5)
MCHC RBC AUTO-ENTMCNC: 31.3 G/DL (ref 31.5–36.5)
MCV RBC AUTO: 79 FL (ref 78–100)
MCV RBC AUTO: 80 FL (ref 78–100)
MONOCYTES # BLD AUTO: 0.7 10E3/UL (ref 0–1.3)
MONOCYTES NFR BLD AUTO: 9 %
NEUTROPHILS # BLD AUTO: 4 10E3/UL (ref 1.6–8.3)
NEUTROPHILS NFR BLD AUTO: 52 %
NRBC # BLD AUTO: 0 10E3/UL
NRBC BLD AUTO-RTO: 0 /100
NT-PROBNP SERPL-MCNC: 207 PG/ML (ref 0–353)
P AXIS - MUSE: 30 DEGREES
P AXIS - MUSE: 33 DEGREES
PLATELET # BLD AUTO: 298 10E3/UL (ref 150–450)
PLATELET # BLD AUTO: 323 10E3/UL (ref 150–450)
POTASSIUM SERPL-SCNC: 3.4 MMOL/L (ref 3.4–5.3)
PR INTERVAL - MUSE: 158 MS
PR INTERVAL - MUSE: 174 MS
QRS DURATION - MUSE: 74 MS
QRS DURATION - MUSE: 76 MS
QT - MUSE: 400 MS
QT - MUSE: 432 MS
QTC - MUSE: 446 MS
QTC - MUSE: 449 MS
R AXIS - MUSE: -12 DEGREES
R AXIS - MUSE: -23 DEGREES
RBC # BLD AUTO: 3.87 10E6/UL (ref 3.8–5.2)
RBC # BLD AUTO: 4.17 10E6/UL (ref 3.8–5.2)
SODIUM SERPL-SCNC: 136 MMOL/L (ref 135–145)
SYSTOLIC BLOOD PRESSURE - MUSE: NORMAL MMHG
SYSTOLIC BLOOD PRESSURE - MUSE: NORMAL MMHG
T AXIS - MUSE: 26 DEGREES
T AXIS - MUSE: 62 DEGREES
TROPONIN T SERPL HS-MCNC: 1126 NG/L
TROPONIN T SERPL HS-MCNC: 1316 NG/L
TROPONIN T SERPL HS-MCNC: 269 NG/L
TROPONIN T SERPL HS-MCNC: 998 NG/L
VENTRICULAR RATE- MUSE: 65 BPM
VENTRICULAR RATE- MUSE: 75 BPM
WBC # BLD AUTO: 6.8 10E3/UL (ref 4–11)
WBC # BLD AUTO: 7.7 10E3/UL (ref 4–11)

## 2025-07-15 PROCEDURE — 250N000009 HC RX 250: Performed by: INTERNAL MEDICINE

## 2025-07-15 PROCEDURE — C1894 INTRO/SHEATH, NON-LASER: HCPCS | Performed by: INTERNAL MEDICINE

## 2025-07-15 PROCEDURE — 93458 L HRT ARTERY/VENTRICLE ANGIO: CPT | Mod: 26 | Performed by: INTERNAL MEDICINE

## 2025-07-15 PROCEDURE — 93458 L HRT ARTERY/VENTRICLE ANGIO: CPT | Performed by: INTERNAL MEDICINE

## 2025-07-15 PROCEDURE — 96374 THER/PROPH/DIAG INJ IV PUSH: CPT | Mod: 59 | Performed by: STUDENT IN AN ORGANIZED HEALTH CARE EDUCATION/TRAINING PROGRAM

## 2025-07-15 PROCEDURE — 027034Z DILATION OF CORONARY ARTERY, ONE ARTERY WITH DRUG-ELUTING INTRALUMINAL DEVICE, PERCUTANEOUS APPROACH: ICD-10-PCS | Performed by: INTERNAL MEDICINE

## 2025-07-15 PROCEDURE — 999N000208 ECHOCARDIOGRAM COMPLETE

## 2025-07-15 PROCEDURE — 258N000003 HC RX IP 258 OP 636: Performed by: STUDENT IN AN ORGANIZED HEALTH CARE EDUCATION/TRAINING PROGRAM

## 2025-07-15 PROCEDURE — 99152 MOD SED SAME PHYS/QHP 5/>YRS: CPT | Performed by: INTERNAL MEDICINE

## 2025-07-15 PROCEDURE — 80048 BASIC METABOLIC PNL TOTAL CA: CPT | Performed by: STUDENT IN AN ORGANIZED HEALTH CARE EDUCATION/TRAINING PROGRAM

## 2025-07-15 PROCEDURE — B211YZZ FLUOROSCOPY OF MULTIPLE CORONARY ARTERIES USING OTHER CONTRAST: ICD-10-PCS | Performed by: INTERNAL MEDICINE

## 2025-07-15 PROCEDURE — 93306 TTE W/DOPPLER COMPLETE: CPT | Mod: 26 | Performed by: INTERNAL MEDICINE

## 2025-07-15 PROCEDURE — 83880 ASSAY OF NATRIURETIC PEPTIDE: CPT | Performed by: STUDENT IN AN ORGANIZED HEALTH CARE EDUCATION/TRAINING PROGRAM

## 2025-07-15 PROCEDURE — C1760 CLOSURE DEV, VASC: HCPCS | Performed by: INTERNAL MEDICINE

## 2025-07-15 PROCEDURE — 99291 CRITICAL CARE FIRST HOUR: CPT | Mod: 25 | Performed by: STUDENT IN AN ORGANIZED HEALTH CARE EDUCATION/TRAINING PROGRAM

## 2025-07-15 PROCEDURE — 99153 MOD SED SAME PHYS/QHP EA: CPT | Performed by: INTERNAL MEDICINE

## 2025-07-15 PROCEDURE — C1769 GUIDE WIRE: HCPCS | Performed by: INTERNAL MEDICINE

## 2025-07-15 PROCEDURE — 250N000013 HC RX MED GY IP 250 OP 250 PS 637: Performed by: INTERNAL MEDICINE

## 2025-07-15 PROCEDURE — 210N000001 HC R&B IMCU HEART CARE

## 2025-07-15 PROCEDURE — 250N000013 HC RX MED GY IP 250 OP 250 PS 637: Performed by: NURSE PRACTITIONER

## 2025-07-15 PROCEDURE — 85027 COMPLETE CBC AUTOMATED: CPT | Performed by: STUDENT IN AN ORGANIZED HEALTH CARE EDUCATION/TRAINING PROGRAM

## 2025-07-15 PROCEDURE — 93010 ELECTROCARDIOGRAM REPORT: CPT | Performed by: STUDENT IN AN ORGANIZED HEALTH CARE EDUCATION/TRAINING PROGRAM

## 2025-07-15 PROCEDURE — 84484 ASSAY OF TROPONIN QUANT: CPT | Performed by: INTERNAL MEDICINE

## 2025-07-15 PROCEDURE — 99285 EMERGENCY DEPT VISIT HI MDM: CPT | Mod: 25 | Performed by: STUDENT IN AN ORGANIZED HEALTH CARE EDUCATION/TRAINING PROGRAM

## 2025-07-15 PROCEDURE — 92941 PRQ TRLML REVSC TOT OCCL AMI: CPT | Mod: RC | Performed by: INTERNAL MEDICINE

## 2025-07-15 PROCEDURE — 250N000011 HC RX IP 250 OP 636: Performed by: INTERNAL MEDICINE

## 2025-07-15 PROCEDURE — 93005 ELECTROCARDIOGRAM TRACING: CPT | Performed by: STUDENT IN AN ORGANIZED HEALTH CARE EDUCATION/TRAINING PROGRAM

## 2025-07-15 PROCEDURE — 250N000011 HC RX IP 250 OP 636: Performed by: NURSE PRACTITIONER

## 2025-07-15 PROCEDURE — 99223 1ST HOSP IP/OBS HIGH 75: CPT | Performed by: INTERNAL MEDICINE

## 2025-07-15 PROCEDURE — 85004 AUTOMATED DIFF WBC COUNT: CPT | Performed by: STUDENT IN AN ORGANIZED HEALTH CARE EDUCATION/TRAINING PROGRAM

## 2025-07-15 PROCEDURE — 250N000011 HC RX IP 250 OP 636: Performed by: STUDENT IN AN ORGANIZED HEALTH CARE EDUCATION/TRAINING PROGRAM

## 2025-07-15 PROCEDURE — 84484 ASSAY OF TROPONIN QUANT: CPT | Performed by: STUDENT IN AN ORGANIZED HEALTH CARE EDUCATION/TRAINING PROGRAM

## 2025-07-15 PROCEDURE — 93010 ELECTROCARDIOGRAM REPORT: CPT | Mod: 77 | Performed by: INTERNAL MEDICINE

## 2025-07-15 PROCEDURE — 250N000013 HC RX MED GY IP 250 OP 250 PS 637: Performed by: EMERGENCY MEDICINE

## 2025-07-15 PROCEDURE — C1874 STENT, COATED/COV W/DEL SYS: HCPCS | Performed by: INTERNAL MEDICINE

## 2025-07-15 PROCEDURE — 99223 1ST HOSP IP/OBS HIGH 75: CPT | Performed by: EMERGENCY MEDICINE

## 2025-07-15 PROCEDURE — 93005 ELECTROCARDIOGRAM TRACING: CPT

## 2025-07-15 PROCEDURE — 93005 ELECTROCARDIOGRAM TRACING: CPT | Performed by: NURSE PRACTITIONER

## 2025-07-15 PROCEDURE — 85347 COAGULATION TIME ACTIVATED: CPT

## 2025-07-15 PROCEDURE — 93005 ELECTROCARDIOGRAM TRACING: CPT | Performed by: INTERNAL MEDICINE

## 2025-07-15 PROCEDURE — 74174 CTA ABD&PLVS W/CONTRAST: CPT

## 2025-07-15 PROCEDURE — 36415 COLL VENOUS BLD VENIPUNCTURE: CPT | Performed by: EMERGENCY MEDICINE

## 2025-07-15 PROCEDURE — 4A023N7 MEASUREMENT OF CARDIAC SAMPLING AND PRESSURE, LEFT HEART, PERCUTANEOUS APPROACH: ICD-10-PCS | Performed by: INTERNAL MEDICINE

## 2025-07-15 PROCEDURE — 272N000001 HC OR GENERAL SUPPLY STERILE: Performed by: INTERNAL MEDICINE

## 2025-07-15 PROCEDURE — 96375 TX/PRO/DX INJ NEW DRUG ADDON: CPT | Mod: 59 | Performed by: STUDENT IN AN ORGANIZED HEALTH CARE EDUCATION/TRAINING PROGRAM

## 2025-07-15 PROCEDURE — C1725 CATH, TRANSLUMIN NON-LASER: HCPCS | Performed by: INTERNAL MEDICINE

## 2025-07-15 PROCEDURE — 255N000002 HC RX 255 OP 636: Performed by: INTERNAL MEDICINE

## 2025-07-15 PROCEDURE — 36415 COLL VENOUS BLD VENIPUNCTURE: CPT | Performed by: STUDENT IN AN ORGANIZED HEALTH CARE EDUCATION/TRAINING PROGRAM

## 2025-07-15 PROCEDURE — 36415 COLL VENOUS BLD VENIPUNCTURE: CPT | Performed by: INTERNAL MEDICINE

## 2025-07-15 PROCEDURE — 82565 ASSAY OF CREATININE: CPT | Mod: XU

## 2025-07-15 PROCEDURE — C1887 CATHETER, GUIDING: HCPCS | Performed by: INTERNAL MEDICINE

## 2025-07-15 PROCEDURE — C9606 PERC D-E COR REVASC W AMI S: HCPCS | Mod: RC | Performed by: INTERNAL MEDICINE

## 2025-07-15 PROCEDURE — 255N000002 HC RX 255 OP 636: Performed by: NURSE PRACTITIONER

## 2025-07-15 DEVICE — STENT CORONARY SYNERGY XD MR US 4.0X48MM H7493941848400: Type: IMPLANTABLE DEVICE | Status: FUNCTIONAL

## 2025-07-15 DEVICE — CLOSURE ANGIOSEAL 6FR 610130: Type: IMPLANTABLE DEVICE | Status: FUNCTIONAL

## 2025-07-15 DEVICE — STENT CORONARY DES SYNERGY XD MR US 4.00X24MM H7493941824400: Type: IMPLANTABLE DEVICE | Status: FUNCTIONAL

## 2025-07-15 RX ORDER — ASPIRIN 81 MG/1
81 TABLET ORAL DAILY
Qty: 30 TABLET | Refills: 3 | Status: SHIPPED | OUTPATIENT
Start: 2025-07-16

## 2025-07-15 RX ORDER — ROSUVASTATIN CALCIUM 40 MG/1
40 TABLET, COATED ORAL DAILY
Qty: 90 TABLET | Refills: 3 | Status: SHIPPED | OUTPATIENT
Start: 2025-07-15

## 2025-07-15 RX ORDER — TOPIRAMATE 25 MG/1
50 TABLET, FILM COATED ORAL EVERY EVENING
Status: DISCONTINUED | OUTPATIENT
Start: 2025-07-15 | End: 2025-07-16 | Stop reason: HOSPADM

## 2025-07-15 RX ORDER — CLOPIDOGREL BISULFATE 75 MG/1
75 TABLET ORAL DAILY
Status: DISCONTINUED | OUTPATIENT
Start: 2025-07-16 | End: 2025-07-16 | Stop reason: HOSPADM

## 2025-07-15 RX ORDER — VENLAFAXINE HYDROCHLORIDE 75 MG/1
75 CAPSULE, EXTENDED RELEASE ORAL DAILY
Status: DISCONTINUED | OUTPATIENT
Start: 2025-07-15 | End: 2025-07-16 | Stop reason: HOSPADM

## 2025-07-15 RX ORDER — HYDROCHLOROTHIAZIDE 25 MG/1
25 TABLET ORAL EVERY MORNING
COMMUNITY
Start: 2025-06-19

## 2025-07-15 RX ORDER — OXYCODONE HYDROCHLORIDE 5 MG/1
5 TABLET ORAL EVERY 4 HOURS PRN
Status: DISCONTINUED | OUTPATIENT
Start: 2025-07-15 | End: 2025-07-16 | Stop reason: HOSPADM

## 2025-07-15 RX ORDER — ROSUVASTATIN CALCIUM 10 MG/1
40 TABLET, COATED ORAL DAILY
Status: DISCONTINUED | OUTPATIENT
Start: 2025-07-15 | End: 2025-07-15 | Stop reason: HOSPADM

## 2025-07-15 RX ORDER — NITROGLYCERIN 20 MG/100ML
INJECTION INTRAVENOUS CONTINUOUS PRN
Status: DISCONTINUED | OUTPATIENT
Start: 2025-07-15 | End: 2025-07-15 | Stop reason: HOSPADM

## 2025-07-15 RX ORDER — IOPAMIDOL 755 MG/ML
90 INJECTION, SOLUTION INTRAVASCULAR ONCE
Status: COMPLETED | OUTPATIENT
Start: 2025-07-15 | End: 2025-07-15

## 2025-07-15 RX ORDER — SPIRONOLACTONE 50 MG/1
50 TABLET, FILM COATED ORAL DAILY
COMMUNITY

## 2025-07-15 RX ORDER — FLUMAZENIL 0.1 MG/ML
0.2 INJECTION, SOLUTION INTRAVENOUS
Status: ACTIVE | OUTPATIENT
Start: 2025-07-15 | End: 2025-07-15

## 2025-07-15 RX ORDER — NITROGLYCERIN 20 MG/100ML
10-200 INJECTION INTRAVENOUS CONTINUOUS
Status: DISCONTINUED | OUTPATIENT
Start: 2025-07-15 | End: 2025-07-15 | Stop reason: HOSPADM

## 2025-07-15 RX ORDER — ACETAMINOPHEN 325 MG/1
650 TABLET ORAL EVERY 4 HOURS PRN
Status: DISCONTINUED | OUTPATIENT
Start: 2025-07-15 | End: 2025-07-16 | Stop reason: HOSPADM

## 2025-07-15 RX ORDER — HEPARIN SODIUM 1000 [USP'U]/ML
INJECTION, SOLUTION INTRAVENOUS; SUBCUTANEOUS
Status: DISCONTINUED | OUTPATIENT
Start: 2025-07-15 | End: 2025-07-15 | Stop reason: HOSPADM

## 2025-07-15 RX ORDER — ONDANSETRON 2 MG/ML
4 INJECTION INTRAMUSCULAR; INTRAVENOUS EVERY 6 HOURS PRN
Status: DISCONTINUED | OUTPATIENT
Start: 2025-07-15 | End: 2025-07-16 | Stop reason: HOSPADM

## 2025-07-15 RX ORDER — ONDANSETRON 4 MG/1
4 TABLET, ORALLY DISINTEGRATING ORAL EVERY 6 HOURS PRN
Status: DISCONTINUED | OUTPATIENT
Start: 2025-07-15 | End: 2025-07-16 | Stop reason: HOSPADM

## 2025-07-15 RX ORDER — ACETAMINOPHEN 500 MG
500-1000 TABLET ORAL EVERY 4 HOURS PRN
COMMUNITY

## 2025-07-15 RX ORDER — METOPROLOL TARTRATE 1 MG/ML
5 INJECTION, SOLUTION INTRAVENOUS
Status: DISCONTINUED | OUTPATIENT
Start: 2025-07-15 | End: 2025-07-16 | Stop reason: HOSPADM

## 2025-07-15 RX ORDER — NALOXONE HYDROCHLORIDE 0.4 MG/ML
0.4 INJECTION, SOLUTION INTRAMUSCULAR; INTRAVENOUS; SUBCUTANEOUS
Status: ACTIVE | OUTPATIENT
Start: 2025-07-15 | End: 2025-07-15

## 2025-07-15 RX ORDER — NALOXONE HYDROCHLORIDE 0.4 MG/ML
0.2 INJECTION, SOLUTION INTRAMUSCULAR; INTRAVENOUS; SUBCUTANEOUS
Status: ACTIVE | OUTPATIENT
Start: 2025-07-15 | End: 2025-07-15

## 2025-07-15 RX ORDER — HEPARIN SODIUM 10000 [USP'U]/100ML
0-5000 INJECTION, SOLUTION INTRAVENOUS CONTINUOUS
Status: DISCONTINUED | OUTPATIENT
Start: 2025-07-15 | End: 2025-07-15 | Stop reason: HOSPADM

## 2025-07-15 RX ORDER — CHOLECALCIFEROL (VITAMIN D3) 50 MCG
1 TABLET ORAL DAILY
COMMUNITY

## 2025-07-15 RX ORDER — HYDROCHLOROTHIAZIDE 12.5 MG/1
12.5 TABLET ORAL
Status: DISCONTINUED | OUTPATIENT
Start: 2025-07-15 | End: 2025-07-16 | Stop reason: HOSPADM

## 2025-07-15 RX ORDER — METOPROLOL TARTRATE 25 MG/1
25 TABLET, FILM COATED ORAL 2 TIMES DAILY
Status: DISCONTINUED | OUTPATIENT
Start: 2025-07-15 | End: 2025-07-16

## 2025-07-15 RX ORDER — FENTANYL CITRATE 50 UG/ML
INJECTION, SOLUTION INTRAMUSCULAR; INTRAVENOUS
Status: DISCONTINUED | OUTPATIENT
Start: 2025-07-15 | End: 2025-07-15 | Stop reason: HOSPADM

## 2025-07-15 RX ORDER — FENTANYL CITRATE 50 UG/ML
25 INJECTION, SOLUTION INTRAMUSCULAR; INTRAVENOUS
Status: DISCONTINUED | OUTPATIENT
Start: 2025-07-15 | End: 2025-07-15

## 2025-07-15 RX ORDER — IODIXANOL 320 MG/ML
INJECTION, SOLUTION INTRAVASCULAR
Status: DISCONTINUED | OUTPATIENT
Start: 2025-07-15 | End: 2025-07-15 | Stop reason: HOSPADM

## 2025-07-15 RX ORDER — OMEPRAZOLE 20 MG/1
20 CAPSULE, DELAYED RELEASE ORAL DAILY
Status: ON HOLD | COMMUNITY
Start: 2025-07-10 | End: 2025-07-16

## 2025-07-15 RX ORDER — CLOPIDOGREL 300 MG/1
600 TABLET, FILM COATED ORAL ONCE
Status: COMPLETED | OUTPATIENT
Start: 2025-07-15 | End: 2025-07-15

## 2025-07-15 RX ORDER — HYDRALAZINE HYDROCHLORIDE 20 MG/ML
10 INJECTION INTRAMUSCULAR; INTRAVENOUS EVERY 4 HOURS PRN
Status: DISCONTINUED | OUTPATIENT
Start: 2025-07-15 | End: 2025-07-16 | Stop reason: HOSPADM

## 2025-07-15 RX ORDER — ASPIRIN 81 MG/1
81 TABLET ORAL DAILY
Status: DISCONTINUED | OUTPATIENT
Start: 2025-07-16 | End: 2025-07-16 | Stop reason: HOSPADM

## 2025-07-15 RX ORDER — OXYCODONE HYDROCHLORIDE 5 MG/1
10 TABLET ORAL EVERY 4 HOURS PRN
Status: DISCONTINUED | OUTPATIENT
Start: 2025-07-15 | End: 2025-07-16 | Stop reason: HOSPADM

## 2025-07-15 RX ORDER — OMEPRAZOLE 20 MG/1
20 CAPSULE, DELAYED RELEASE ORAL DAILY
Status: DISCONTINUED | OUTPATIENT
Start: 2025-07-15 | End: 2025-07-15 | Stop reason: ALTCHOICE

## 2025-07-15 RX ORDER — FENTANYL CITRATE 50 UG/ML
75 INJECTION, SOLUTION INTRAMUSCULAR; INTRAVENOUS ONCE
Refills: 0 | Status: COMPLETED | OUTPATIENT
Start: 2025-07-15 | End: 2025-07-15

## 2025-07-15 RX ORDER — SPIRONOLACTONE 25 MG/1
25 TABLET ORAL DAILY
Status: DISCONTINUED | OUTPATIENT
Start: 2025-07-15 | End: 2025-07-16 | Stop reason: HOSPADM

## 2025-07-15 RX ORDER — SODIUM CHLORIDE 9 MG/ML
INJECTION, SOLUTION INTRAVENOUS CONTINUOUS
Status: ACTIVE | OUTPATIENT
Start: 2025-07-15 | End: 2025-07-15

## 2025-07-15 RX ORDER — ATROPINE SULFATE 0.1 MG/ML
0.5 INJECTION INTRAVENOUS
Status: ACTIVE | OUTPATIENT
Start: 2025-07-15 | End: 2025-07-15

## 2025-07-15 RX ORDER — CLOPIDOGREL BISULFATE 75 MG/1
75 TABLET ORAL DAILY
Qty: 90 TABLET | Refills: 3 | Status: SHIPPED | OUTPATIENT
Start: 2025-07-16

## 2025-07-15 RX ORDER — HEPARIN SODIUM 200 [USP'U]/100ML
INJECTION, SOLUTION INTRAVENOUS
Status: DISCONTINUED | OUTPATIENT
Start: 2025-07-15 | End: 2025-07-15 | Stop reason: HOSPADM

## 2025-07-15 RX ORDER — TICAGRELOR 90 MG/1
TABLET, FILM COATED ORAL
Status: DISCONTINUED | OUTPATIENT
Start: 2025-07-15 | End: 2025-07-15 | Stop reason: HOSPADM

## 2025-07-15 RX ORDER — NITROGLYCERIN 5 MG/ML
VIAL (ML) INTRAVENOUS
Status: DISCONTINUED | OUTPATIENT
Start: 2025-07-15 | End: 2025-07-15 | Stop reason: HOSPADM

## 2025-07-15 RX ORDER — TOPIRAMATE 50 MG/1
TABLET, FILM COATED ORAL
COMMUNITY
Start: 2025-07-10

## 2025-07-15 RX ORDER — RIZATRIPTAN BENZOATE 10 MG/1
10 TABLET ORAL
COMMUNITY
Start: 2025-04-08

## 2025-07-15 RX ORDER — PANTOPRAZOLE SODIUM 40 MG/1
40 TABLET, DELAYED RELEASE ORAL
Status: DISCONTINUED | OUTPATIENT
Start: 2025-07-16 | End: 2025-07-16 | Stop reason: HOSPADM

## 2025-07-15 RX ORDER — NITROGLYCERIN 0.4 MG/1
0.4 TABLET SUBLINGUAL EVERY 5 MIN PRN
Status: DISCONTINUED | OUTPATIENT
Start: 2025-07-15 | End: 2025-07-16 | Stop reason: HOSPADM

## 2025-07-15 RX ORDER — ROSUVASTATIN CALCIUM 40 MG/1
40 TABLET, COATED ORAL DAILY
Status: DISCONTINUED | OUTPATIENT
Start: 2025-07-15 | End: 2025-07-16 | Stop reason: HOSPADM

## 2025-07-15 RX ADMIN — SODIUM CHLORIDE 1000 ML: 0.9 INJECTION, SOLUTION INTRAVENOUS at 07:33

## 2025-07-15 RX ADMIN — FENTANYL CITRATE 75 MCG: 50 INJECTION INTRAMUSCULAR; INTRAVENOUS at 07:34

## 2025-07-15 RX ADMIN — NITROGLYCERIN 10 MCG/MIN: 20 INJECTION INTRAVENOUS at 08:33

## 2025-07-15 RX ADMIN — IOPAMIDOL 90 ML: 755 INJECTION, SOLUTION INTRAVENOUS at 07:22

## 2025-07-15 RX ADMIN — METOPROLOL TARTRATE 25 MG: 25 TABLET, FILM COATED ORAL at 21:01

## 2025-07-15 RX ADMIN — ONDANSETRON 4 MG: 2 INJECTION, SOLUTION INTRAMUSCULAR; INTRAVENOUS at 23:49

## 2025-07-15 RX ADMIN — SPIRONOLACTONE 25 MG: 25 TABLET, FILM COATED ORAL at 12:48

## 2025-07-15 RX ADMIN — TOPIRAMATE 50 MG: 25 TABLET, FILM COATED ORAL at 21:01

## 2025-07-15 RX ADMIN — VENLAFAXINE HYDROCHLORIDE 75 MG: 75 CAPSULE, EXTENDED RELEASE ORAL at 12:49

## 2025-07-15 RX ADMIN — CLOPIDOGREL BISULFATE 600 MG: 300 TABLET, FILM COATED ORAL at 15:11

## 2025-07-15 RX ADMIN — ACETAMINOPHEN 650 MG: 325 TABLET ORAL at 15:04

## 2025-07-15 RX ADMIN — HYDROCHLOROTHIAZIDE 12.5 MG: 12.5 TABLET ORAL at 17:13

## 2025-07-15 RX ADMIN — PERFLUTREN 2 ML: 6.52 INJECTION, SUSPENSION INTRAVENOUS at 10:57

## 2025-07-15 RX ADMIN — HEPARIN SODIUM 950 UNITS/HR: 10000 INJECTION, SOLUTION INTRAVENOUS at 08:13

## 2025-07-15 RX ADMIN — ACETAMINOPHEN 650 MG: 325 TABLET ORAL at 15:07

## 2025-07-15 RX ADMIN — SODIUM CHLORIDE 1000 ML: 0.9 INJECTION, SOLUTION INTRAVENOUS at 08:19

## 2025-07-15 RX ADMIN — METOPROLOL TARTRATE 25 MG: 25 TABLET, FILM COATED ORAL at 12:49

## 2025-07-15 RX ADMIN — NITROGLYCERIN 0.4 MG: 0.4 TABLET, ORALLY DISINTEGRATING SUBLINGUAL at 15:05

## 2025-07-15 RX ADMIN — HYDROCHLOROTHIAZIDE 12.5 MG: 12.5 TABLET ORAL at 12:49

## 2025-07-15 RX ADMIN — ROSUVASTATIN 40 MG: 40 TABLET, FILM COATED ORAL at 12:48

## 2025-07-15 RX ADMIN — NITROGLYCERIN 0.4 MG: 0.4 TABLET, ORALLY DISINTEGRATING SUBLINGUAL at 15:00

## 2025-07-15 ASSESSMENT — ACTIVITIES OF DAILY LIVING (ADL)
ADLS_ACUITY_SCORE: 43
ADLS_ACUITY_SCORE: 41
ADLS_ACUITY_SCORE: 43
ADLS_ACUITY_SCORE: 41
ADLS_ACUITY_SCORE: 41
ADLS_ACUITY_SCORE: 43
ADLS_ACUITY_SCORE: 26
ADLS_ACUITY_SCORE: 43
ADLS_ACUITY_SCORE: 26
ADLS_ACUITY_SCORE: 41
ADLS_ACUITY_SCORE: 43
ADLS_ACUITY_SCORE: 41
ADLS_ACUITY_SCORE: 20
ADLS_ACUITY_SCORE: 43

## 2025-07-15 NOTE — H&P
"ADMISSION HISTORY & PHYSICAL      Vivian Aranda, [phone]  ASSESSMENT AND PLAN:  70 year old female with history of lumbar stenosis/decompression, hypertension, hyperlipidemia surgery presenting with STEMI:    STEMI: Emergent cath showed acute thrombotic plaque rupture involving the mid RCA with mild nonobstructive disease elsewhere, successful PCI with SALLY x 1.  Cardiology following, DAPT minimum of 1 year.  Mood disorder: Continue home Effexor  Hypertension: Home medications resumed  Clinically Significant Risk Factors Present on Admission          # Hypochloremia: Lowest Cl = 97 mmol/L in last 2 days, will monitor as appropriate               # Anemia: based on hgb <11       # Obesity: Estimated body mass index is 38.11 kg/m  as calculated from the following:    Height as of an earlier encounter on 7/15/25: 1.422 m (4' 8\").    Weight as of an earlier encounter on 7/15/25: 77.1 kg (170 lb).               Barriers to discharge: Procedural recovery      CHIEF COMPLAINT:      HISTORY OF PRESENTING ILLNESS:  Arlene Becker is a 70 year old female with no prior known heart disease admitted after SALLY placed for STEMI.  Was originally seen at [facility] and transferred here for emergent catheterization.  Currently she denies any chest pain and has no new complaints currently.  No fevers or chills, no shortness of breath.  Consult placed for [facility] after procedure completed and patient transferred up to .  Overall she feels much better now.    PMH/PSH:  Patient Active Problem List   Diagnosis    PNA (pneumonia)    CAP (community acquired pneumonia)    Dyslipidemia    GERD (gastroesophageal reflux disease)    Osteopenia, senile    S/P right coronary artery (RCA) stent placement    Percutaneous transluminal coronary angioplasty status       ALLERGIES:  Allergies   Allergen Reactions    Sulfa (Sulfonamide Antibiotics) [Sulfa Antibiotics] Hives       MEDICATIONS:  Reviewed.  Current Facility-Administered Medications "   Medication Dose Route Frequency Provider Last Rate Last Admin    acetaminophen (TYLENOL) tablet 650 mg  650 mg Oral Q4H PRN Beulah Kramer CNP        [START ON 7/16/2025] aspirin EC tablet 81 mg  81 mg Oral Daily Beulah Kramer CNP        atropine injection 0.5 mg  0.5 mg Intravenous Once PRN Beulah Kramer CNP        clopidogrel (PLAVIX) tablet 600 mg  600 mg Oral Once Beulah Kramer CNP        [START ON 7/16/2025] clopidogrel (PLAVIX) tablet 75 mg  75 mg Oral Daily Beulah Kramer CNP        flumazenil (ROMAZICON) injection 0.2 mg  0.2 mg Intravenous q1 min prn Beulah Kramer CNP        HOLD: Metformin and metformin containing medications on day of procedure and 48 hours after IV contrast given for patients with acute kidney injury or severe chronic kidney disease (stage IV or stage V; i.e., eGFR less than 30)   Does not apply HOLD Beulah Kramer CNP        hydrALAZINE (APRESOLINE) injection 10 mg  10 mg Intravenous Q4H PRN Beulah Kramer CNP        hydroCHLOROthiazide tablet 12.5 mg  12.5 mg Oral BID Beulah Krmaer CNP   12.5 mg at 07/15/25 1249    metoprolol (LOPRESSOR) injection 5 mg  5 mg Intravenous Q15 Min PRN Beulah Kramer CNP        metoprolol tartrate (LOPRESSOR) tablet 25 mg  25 mg Oral BID Beulah Kramer CNP   25 mg at 07/15/25 1249    naloxone (NARCAN) injection 0.2 mg  0.2 mg Intravenous Q2 Min PRN Beulah Kramer CNP        Or    naloxone (NARCAN) injection 0.4 mg  0.4 mg Intravenous Q2 Min PRN Beulah Kramer CNP        Or    naloxone (NARCAN) injection 0.2 mg  0.2 mg Intramuscular Q2 Min PRN Beulah Kramer CNP        Or    naloxone (NARCAN) injection 0.4 mg  0.4 mg Intramuscular Q2 Min PRN Beulah Kramer CNP        nitroGLYcerin (NITROSTAT) sublingual tablet 0.4 mg  0.4 mg Sublingual Q5 Min PRN Beulah Kramer, CNP        ondansetron (ZOFRAN  ODT) ODT tab 4 mg  4 mg Oral Q6H PRN Beulah Kramer CNP        Or    ondansetron (ZOFRAN) injection 4 mg  4 mg Intravenous Q6H PRN Beulah Kramer CNP        oxyCODONE (ROXICODONE) tablet 5 mg  5 mg Oral Q4H PRN Beulah Kramer CNP        Or    oxyCODONE (ROXICODONE) tablet 10 mg  10 mg Oral Q4H PRN Beulah Kramer CNP        [START ON 2025] pantoprazole (PROTONIX) EC tablet 40 mg  40 mg Oral QAM AC Beulah Kramer CNP        Percutaneous Coronary Intervention orders placed (this is information for BPA alerting)   Does not apply DOES NOT GO TO Beulah Mcmanus CNP        Reason beta blocker order not selected   Does not apply DOES NOT GO TO Beulah Mcmanus CNP        rosuvastatin (CRESTOR) tablet 40 mg  40 mg Oral Daily Beulah Kramer CNP   40 mg at 07/15/25 1248    sodium chloride 0.9 % infusion   Intravenous Continuous Beulah Kramer CNP        sodium chloride 0.9% BOLUS 250 mL  250 mL Intravenous Once PRN Beulah rKamer CNP        spironolactone (ALDACTONE) tablet 25 mg  25 mg Oral Daily Beulah Kramer CNP   25 mg at 07/15/25 1248    venlafaxine (EFFEXOR XR) 24 hr capsule 75 mg  75 mg Oral Daily Beulah Kramer CNP   75 mg at 07/15/25 1249       SOCIAL HISTORY:  Social History     Socioeconomic History    Marital status:      Spouse name: Not on file    Number of children: Not on file    Years of education: Not on file    Highest education level: Not on file   Occupational History    Not on file   Tobacco Use    Smoking status: Former     Current packs/day: 0.00     Types: Cigarettes     Quit date: 2013     Years since quittin.2    Smokeless tobacco: Not on file   Substance and Sexual Activity    Alcohol use: Not on file    Drug use: Not on file    Sexual activity: Not on file   Other Topics Concern    Not on file   Social History Narrative    Not on file     Social Drivers  of Health     Financial Resource Strain: Not on file   Food Insecurity: Not on file   Transportation Needs: Not on file   Physical Activity: Not on file   Stress: Not on file   Social Connections: Not on file   Interpersonal Safety: Not on file   Housing Stability: Not on file       FAMILY HISTORY:  Family History   Problem Relation Age of Onset    Coronary Artery Disease Mother     Coronary Artery Disease Father     Coronary Artery Disease Brother         stent       ROS:  12 point ROS was performed and found to be negative except for the pertinent positives mentioned in the HPI.      PHYSICAL EXAM:  /75   Pulse 69   Temp 97.5  F (36.4  C) (Oral)   Resp 18   SpO2 97%   No intake/output data recorded.  No intake/output data recorded.  CONSTITUTIONAL: No apparent distress  HEENT:       Sclera- anicteric      Mucous membrane- moist and pink  LUNGS: Clear to auscultation bilaterally  CARDIOVASCULAR: S1S2 regular. No murmurs, rubs or gallops,no pedal edema  GI: Soft. Non-tender, non-distended. No organomegaly. No guarding or rigidity. Bowel sounds- active  NEURO: Cranial nerves II-XII grossly intact. No focal neurological deficit. No involuntary movements  INTGM: No pasllor no cyanosis or clubbing  LYMPH:   MSK: no joint swelling or tenderness  PSYCH: alert and oriented, no agitation      DIAGNOSTIC DATA:  Recent Results (from the past 24 hours)   ECG 12-LEAD WITH MUSE (E)    Collection Time: 07/15/25  6:39 AM   Result Value Ref Range    Systolic Blood Pressure  mmHg    Diastolic Blood Pressure  mmHg    Ventricular Rate 75 BPM    Atrial Rate 75 BPM    IA Interval 158 ms    QRS Duration 74 ms     ms    QTc 446 ms    P Axis 30 degrees    R AXIS -12 degrees    T Axis 62 degrees    Interpretation ECG       Sinus rhythm  Nonspecific ST abnormality  Abnormal ECG  When compared with ECG of 04-Nov-2023 11:57,  ST elevation now present in Inferior leads  Confirmed by SEE ED PROVIDER NOTE FOR, ECG INTERPRETATION  ([id number]),  Sabrina Molina ([id number]) on 7/15/2025 8:02:24 AM     Extra Blue Top Tube    Collection Time: 07/15/25  6:54 AM   Result Value Ref Range    Hold Specimen JIC    Extra Red Top Tube    Collection Time: 07/15/25  6:54 AM   Result Value Ref Range    Hold Specimen JIC    Extra Green Top (Lithium Heparin) Tube    Collection Time: 07/15/25  6:54 AM   Result Value Ref Range    Hold Specimen     Extra Purple Top Tube    Collection Time: 07/15/25  6:54 AM   Result Value Ref Range    Hold Specimen     Basic metabolic panel    Collection Time: 07/15/25  6:54 AM   Result Value Ref Range    Sodium 136 135 - 145 mmol/L    Potassium 3.4 3.4 - 5.3 mmol/L    Chloride 97 (L) 98 - 107 mmol/L    Carbon Dioxide (CO2) 25 22 - 29 mmol/L    Anion Gap 14 7 - 15 mmol/L    Urea Nitrogen 26.2 (H) 8.0 - 23.0 mg/dL    Creatinine 0.92 0.51 - 0.95 mg/dL    GFR Estimate 66 >60 mL/min/1.73m2    Calcium 9.3 8.8 - 10.4 mg/dL    Glucose 115 (H) 70 - 99 mg/dL   NT-proBNP    Collection Time: 07/15/25  6:54 AM   Result Value Ref Range    NT-proBNP 207 0 - 353 pg/mL   Troponin T, High Sensitivity    Collection Time: 07/15/25  6:54 AM   Result Value Ref Range    Troponin T, High Sensitivity 269 (HH) <=14 ng/L   CBC with platelets and differential    Collection Time: 07/15/25  6:54 AM   Result Value Ref Range    WBC Count 7.7 4.0 - 11.0 10e3/uL    RBC Count 4.17 3.80 - 5.20 10e6/uL    Hemoglobin 10.3 (L) 11.7 - 15.7 g/dL    Hematocrit 32.9 (L) 35.0 - 47.0 %    MCV 79 78 - 100 fL    MCH 24.7 (L) 26.5 - 33.0 pg    MCHC 31.3 (L) 31.5 - 36.5 g/dL    RDW 17.1 (H) 10.0 - 15.0 %    Platelet Count 323 150 - 450 10e3/uL    % Neutrophils 52 %    % Lymphocytes 35 %    % Monocytes 9 %    % Eosinophils 3 %    % Basophils 1 %    % Immature Granulocytes 0 %    NRBCs per 100 WBC 0 <1 /100    Absolute Neutrophils 4.0 1.6 - 8.3 10e3/uL    Absolute Lymphocytes 2.7 0.8 - 5.3 10e3/uL    Absolute Monocytes 0.7 0.0 - 1.3 10e3/uL    Absolute Eosinophils 0.3 0.0 - 0.7  10e3/uL    Absolute Basophils 0.1 0.0 - 0.2 10e3/uL    Absolute Immature Granulocytes 0.0 <=0.4 10e3/uL    Absolute NRBCs 0.0 10e3/uL   Creatinine POCT    Collection Time: 07/15/25  7:13 AM   Result Value Ref Range    Creatinine POCT 1.1 (H) 0.5 - 1.0 mg/dL    GFR, ESTIMATED POCT 53 (L) >60 mL/min/1.73m2   CBC with platelets    Collection Time: 07/15/25  8:10 AM   Result Value Ref Range    WBC Count 6.8 4.0 - 11.0 10e3/uL    RBC Count 3.87 3.80 - 5.20 10e6/uL    Hemoglobin 9.5 (L) 11.7 - 15.7 g/dL    Hematocrit 30.9 (L) 35.0 - 47.0 %    MCV 80 78 - 100 fL    MCH 24.5 (L) 26.5 - 33.0 pg    MCHC 30.7 (L) 31.5 - 36.5 g/dL    RDW 17.2 (H) 10.0 - 15.0 %    Platelet Count 298 150 - 450 10e3/uL   Activated clotting time celite, POCT    Collection Time: 07/15/25  9:37 AM   Result Value Ref Range    Activated Clotting Time (Celite) POCT 170 (H) 74 - 150 seconds   Echocardiogram Complete    Collection Time: 07/15/25 10:58 AM   Result Value Ref Range    LVEF  55-60%    ECG 12-lead, with Muse    Collection Time: 07/15/25 12:52 PM   Result Value Ref Range    Systolic Blood Pressure  mmHg    Diastolic Blood Pressure  mmHg    Ventricular Rate 65 BPM    Atrial Rate 65 BPM    ID Interval 174 ms    QRS Duration 76 ms     ms    QTc 449 ms    P Axis 33 degrees    R AXIS -23 degrees    T Axis 26 degrees    Interpretation ECG       Sinus rhythm  Minimal voltage criteria for LVH, may be normal variant ( R in aVL )  Inferior infarct , age undetermined  Abnormal ECG  When compared with ECG of 15-Jul-2025 06:39,  Inferior infarct is now Present  Nonspecific T wave abnormality now evident in Inferior leads       All lab studies reviewed personally    Echocardiogram Complete  Result Date: 7/15/2025  [id number]^MARY^AMALIA^[place] [address]  Name: MILES KASPER LUCIE MRN: [id number] : 1955 Study Date: 07/15/2025 10:38 AM Age: 70 yrs Gender: Female Patient  Location: [facility] Reason For Study: MI - Acute Ordering Physician: AMALIA HERNANDEZ Referring Physician: Drake Freire Performed By: ^^^^  BSA: 1.7 m2 Height: 56 in Weight: 170 lb HR: 71 BP: 143/106 mmHg ______________________________________________________________________________ Procedure Echocardiogram with two-dimensional, color and spectral Doppler. Definity (NDC #43089-584) given intravenously. Technically difficult study. Despite the use of contrast, the posterior wall is not well visualized. No hemodynamically significant valvular abnormalities on 2D or color flow imaging. There is no comparison study available. ______________________________________________________________________________ Interpretation Summary  Left ventricular size, wall motion and function are normal. The ejection fraction is 55-60%. Despite the use of contrast, the posterior wall is not well visualized. Normal right ventricle size and systolic function. The left atrium is mildly dilated. No hemodynamically significant valvular abnormalities on 2D or color flow imaging. There is no comparison study available. ______________________________________________________________________________ Left Ventricle Left ventricular size, wall motion and function are normal. The ejection fraction is 55-60%. There is normal left ventricular wall thickness. Left ventricular diastolic function is normal.  Right Ventricle Normal right ventricle size and systolic function.  Atria The left atrium is mildly dilated. Right atrial size is normal.  Mitral Valve Mitral valve leaflets appear normal. There is no evidence of mitral stenosis or clinically significant mitral regurgitation.  Tricuspid Valve Tricuspid valve leaflets appear normal. There is no evidence of tricuspid stenosis or clinically significant tricuspid regurgitation. Right ventricular systolic pressure could not be approximated due to inadequate tricuspid regurgitation.  Aortic Valve  The aortic valve is trileaflet. Aortic valve leaflets appear normal. There is no evidence of aortic stenosis or clinically significant aortic regurgitation.  Pulmonic Valve The pulmonic valve is not well seen, but is grossly normal. This degree of valvular regurgitation is within normal limits. There is trace pulmonic valvular regurgitation.  Vessels The aorta root is normal. Normal size ascending aorta. IVC diameter <2.1 cm collapsing >50% with sniff suggests a normal RA pressure of 3 mmHg.  Pericardium There is no pericardial effusion.  Rhythm Sinus rhythm was noted. ______________________________________________________________________________ MMode/2D Measurements & Calculations IVSd: 0.85 cm  LVIDd: 4.5 cm LVIDs: 3.5 cm LVPWd: 0.81 cm FS: 22.7 % LV mass(C)d: 120.7 grams LV mass(C)dI: 72.9 grams/m2 Ao root diam: 3.6 cm LA dimension: 3.1 cm asc Aorta Diam: 3.7 cm LA/Ao: 0.87 LVOT diam: 2.2 cm LVOT area: 3.9 cm2 Ao root diam index Ht(cm/m): 2.5 Ao root diam index BSA (cm/m2): 2.2 Asc Ao diam index BSA (cm/m2): 2.2 Asc Ao diam index Ht(cm/m): 2.6 EF Biplane: 56.6 % LA Volume (BP): 64.4 ml  LA Volume Indexed (AL/bp): 40.5 ml/m2 RV Base: 3.3 cm RWT: 0.36 TAPSE: 2.0 cm  Time Measurements MM HR: 71.0 BPM  Doppler Measurements & Calculations MV E max paresh: 48.0 cm/sec MV A max paresh: 111.4 cm/sec MV E/A: 0.43 MV max P.2 mmHg MV mean P.7 mmHg MV V2 VTI: 27.6 cm MVA(VTI): 3.2 cm2 MV dec slope: 195.4 cm/sec2 MV dec time: 0.25 sec Ao V2 max: 113.8 cm/sec Ao max P.0 mmHg Ao V2 mean: 83.9 cm/sec Ao mean PG: 3.1 mmHg Ao V2 VTI: 25.1 cm LEONARDO(I,D): 3.6 cm2 LEONARDO(V,D): 3.5 cm2 AI P1/2t: 480.1 msec  LV V1 max P.4 mmHg LV V1 max: 104.5 cm/sec LV V1 VTI: 23.1 cm SV(LVOT): 89.2 ml SI(LVOT): 53.9 ml/m2 PA acc time: 0.09 sec AV Paresh Ratio (DI): 0.92 LEONARDO Index (cm2/m2): 2.1 E/E' av.7 Lateral E/e': 6.0 Medial E/e': 7.4 RV S Paresh: 12.8 cm/sec  ______________________________________________________________________________  Report approved by: Baljit Gaines MD on 07/15/2025 11:46 AM       Cardiac Catheterization  Result Date: 7/15/2025  CARDIAC CATHETERIZATION AND INTERVENTION REPORT PATIENT NAME:  Arlene Becker        MEDICAL RECORD NUMBER: [id number] : 1955     PROCEDURE DATE: July 15, 2025 CONCLUSIONS: Acute thrombotic plaque rupture involving the mid RCA with mild nonobstructive disease elsewhere, post successful PCI with a 4.0 x 24 and a 4.0 x 48 mm Synergy XD drug-eluting stents. Mildly elevated left-sided filling pressures. No aortic valve stenosis. RECOMMENDATIONS: Usual postprocedure cares, please see orders. DAPT for minimum of 1 year. Aggressive risk factor modification for secondary prevention. PROCEDURE PERFORMED: Selective right and left coronary angiography Left heart catheterization PCI of the RCA PRE-OP DIAGNOSIS: High risk non-ST elevation MI with ongoing chest pain despite medical therapy POST-OP DIAGNOSIS: High risk non-ST elevation MI with ongoing chest pain despite medical therapy PROCEDURALISTS: Drake Freire MD DIAGNOSTIC PROCEDURAL DETAILS: Signed, informed consent was obtained. The patient was placed on the table and prepped and draped in the usual fashion. Time out and site verification performed. Access: Right radial artery access was attempted but was unsuccessful (unable to thread the wire despite multiple attempts).  Right common femoral artery access was obtained using ultrasound guidance and micropuncture technique. Catheters: JL 4, JR4 Hemostasis: Angio-Seal PROCEDURAL MEDICATIONS: Conscious sedation - midazolam and fentanyl, please see procedure log for dosing. Local anesthesia - 1% lidocaine Procedural anticoagulation - 75 units/kg of heparin and intermittent boluses to maintain ACT above 250-300.  Patient was loaded with 180 mg of Brilinta at the end of procedure. CONTRAST VOLUME: 110 mL Visipaque BLOOD LOSS: minimal FLUIDS: None SPECIMENS: None COMPLICATIONS: None FINDINGS: Left Heart  Catheterization  LVEDP 20 mmHg No significant gradient across the aortic valve. Coronary Angiography: LEFT MAIN: Normal caliber vessel that bifurcates into left anterior descending and left circumflex arteries.  The left main has mild luminal irregularities. LEFT ANTERIOR DESCENDING: Small caliber vessel that gives rise to a medium size diagonal branch and multiple septal perforators.  The LAD tapers into a small vessel distally.  The LAD and its branches have mild disease. LEFT CIRCUMFLEX: Nondominant vessel that gives rise to a large OM branch and terminates into a small vessel distally.  The left circumflex and its branch have mild disease. RIGHT CORONARY ARTERY: Large dominant vessel that gives rise to right posterior descending artery posterolateral system.  The proximal right coronary artery has mild disease followed by 95% acute thrombotic lesion in the mid to distal portion.  Distally the right coronary artery has mild disease. INTERVENTIONAL DETAILS: Lesion # 1 : 95% acute thrombotic lesion of the right coronary artery The right coronary artery was engaged using a JR4 guide catheter that provide adequate coaptation and support.  Guide extension was used to further help deliver stents and balloons.  The right coronary artery lesion was traversed with a Runthrough wire was predilated with a 2.5 mm balloon.  The lesion was treated with a 4.0 x 48 and a 4.0 x 24 mm Synergy XD drug-eluting stent that were postdilated with a 4.0 mm NC balloon at high atmospheres.  Preintervention the lesion length was 44 mm with ESTHER III flow.  Postintervention there is 0% residual stenosis within treated segments with ESTHER-3 flow. Please do not hesitate to contact me if you have any questions or concerns. I was present for the procedure in its entirety. Moderate conscious sedation at level 3-4 with fentanyl and midazolam was administered, and I spent continuous face to face time with the patient which encompassed the duration of  the procedure.  Please refer to the sedation flow sheet for additional information.  I have reviewed and agree with the documentation provided in the RN sedation flow sheet as outlined. I concluded sedation and recovery was monitored by the trained independent observer. I attest that I have ordered all medications administered, equipment used, and tests performed during the procedure. Drake Freire MD Interventional Cardiology     CTA Chest Abdomen Pelvis w Contrast  Result Date: 7/15/2025  EXAM: CTA CHEST ABDOMEN PELVIS W CONTRAST LOCATION: [facility] DATE: 7/15/2025 INDICATION: chest pain, radiates to left arm, since midnight, former long term smoker COMPARISON: 04/03/2025 TECHNIQUE: CT angiogram chest abdomen pelvis during arterial phase of injection of IV contrast. 2D and 3D MIP reconstructions were performed by the CT technologist. Dose reduction techniques were used. CONTRAST: Isovue 370 90ml FINDINGS: CT ANGIOGRAM CHEST, ABDOMEN, AND PELVIS: Moderate mixed calcified and noncalcified atherosclerosis of the thoracoabdominal aorta. Noncalcified plaque is particularly prominent in the descending thoracic aorta; for example images 124 and 190, series 6). No thoracoabdominal aortic aneurysm or dissection. No definite penetrating atheromatous ulcer; however, the mixed calcified and noncalcified plaque causes luminal irregularity. The ascending thoracic aorta is normal in caliber, measuring 3.7 cm at the right pulmonary artery. Normal three-vessel branching of the aortic arch. Tortuous normal in caliber descending thoracic aorta. Patent celiac artery and SMA without significant abnormality. Patent DILIP. Patent renal arteries bilaterally without significant abnormality. Normal caliber of the internal and external iliac arteries bilaterally. Normal caliber of the main pulmonary arteries. No central pulmonary artery embolism. LUNGS AND PLEURA: Minimal centrilobular emphysematous lung changes.  Minimal bronchiectasis and mild bronchial wall thickening in the lungs bilaterally. Mosaic attenuation with areas of nonspecific groundglass. Subsegmental atelectasis or linear scarring right middle lobe. No pleural effusion. No findings of pneumonia or significant pulmonary edema. MEDIASTINUM/AXILLAE: There are nodules within the thyroid gland measuring greater than 1 cm. Normal CT appearance of the esophagus. No mediastinal or hilar lymphadenopathy. Top normal heart size. No pericardial effusion. CORONARY ARTERY CALCIFICATION: Mild. HEPATOBILIARY: Cholecystectomy. No biliary ductal dilation. The liver is normal in size and morphology. Unchanged subcentimeter right hepatic lobe segment 7 cyst, not requiring further evaluation. No new hepatic lesion. PANCREAS: Normal. SPLEEN: Normal. ADRENAL GLANDS: Normal. KIDNEYS/BLADDER: Normal. BOWEL: Diverticulosis of the colon. No acute inflammatory change. No obstruction. LYMPH NODES: Normal. PELVIC ORGANS: Post hysterectomy. No suspicious adnexal masses. No pelvic free fluid. MUSCULOSKELETAL: No acute abnormality of the abdominal or chest wall. No axillary lymphadenopathy. No acute or destructive osseous lesion. Posterior lumbar spinal fusion L4-L5 with bilateral rods and pedicle screws. Grade 1 anterior listhesis L4 on L5.     IMPRESSION: 1.  Mixed calcified and noncalcified moderate atherosclerosis of the thoracoabdominal aorta with areas of resultant luminal irregularity; however, no thoracoabdominal aortic aneurysm or dissection. Mild coronary artery calcifications. 2.  Minimal centrilobular emphysema and findings suggesting infectious or inflammatory bronchitis. Mosaic attenuation in the lungs, probable small airways disease. No findings to suggest pneumonia or pulmonary edema. 3.  Thyroid nodules measuring greater than 1 cm for which follow-up with nonemergent outpatient thyroid sonogram is recommended, unless recently performed. 4.  Cholecystectomy. No biliary ductal  dilation. 5.  Noninflamed colonic diverticulosis and additional incidental/chronic findings, as detailed. Findings were discussed with Dr. Strauss at the time of dictation.     Radiology report reviewed.    Medically Ready for Discharge: Anticipated Tomorrow       Medical Decision Making       75 MINUTES SPENT BY ME on the date of service doing chart review, history, exam, documentation & further activities per the note.      Crispin Haney MD  [facility] Medicine Service

## 2025-07-15 NOTE — ED NOTES
Bed: [facility]-  Expected date: 7/15/25  Expected time: 6:22 AM  Means of arrival: Ambulance  Comments:  [facility]  71 y/o F  Chest pain  12lead Neg.  VSS

## 2025-07-15 NOTE — ED TRIAGE NOTES
Pt brought in by EMS from home. Reports left-sided chest pain that woke her up around 0000. Took 1000 mg tylenol around 0400 with no relief. Pain is radiating to the left arm, back, and jaw. Patient was given aspirin and nitroglycerin by EMS.     Triage Assessment (Adult)       Row Name 07/15/25 0645          Triage Assessment    Airway WDL WDL        Respiratory WDL    Respiratory WDL WDL        Skin Circulation/Temperature WDL    Skin Circulation/Temperature WDL WDL        Cardiac WDL    Cardiac WDL X;chest pain        Chest Pain Assessment    Chest Pain Location anterior chest, left     Chest Pain Radiation arm;jaw;back     Character pressure        Peripheral/Neurovascular WDL    Peripheral Neurovascular WDL WDL        Cognitive/Neuro/Behavioral WDL    Cognitive/Neuro/Behavioral WDL WDL

## 2025-07-15 NOTE — Clinical Note
Single balloon inflation. The first balloon was inserted into the right coronary artery and middle right coronary artery.Max pressure = 10 leesa. Total duration = 10 seconds.

## 2025-07-15 NOTE — Clinical Note
The first balloon was inserted into the right coronary artery and middle right coronary artery.Max pressure = 10 leesa. Total duration = 10 seconds.     Max pressure = 12 leesa. Total duration = 5 seconds.    Balloon reinflated a second time: Max pressure = 12 leesa. Total duration = 5 seconds.  Balloon reinflated a third time: Max pressure = 14 leesa. Total duration = 10 seconds.

## 2025-07-15 NOTE — INTERIM SUMMARY
Pt developed chest cp around 2:30 responded to nitroglycerin, currently pain free  Obtaining EKG and troponin stat

## 2025-07-15 NOTE — ED PROVIDER NOTES
EMERGENCY DEPARTMENT ENCOUNTER       ED Course & Medical Decision Making     6:50 AM I met with the patient, obtained history, performed an initial exam, and discussed options and plan for diagnostics and treatment here in the ED.  7:48 AM I discussed the case with cardiology, Dr. Read. Plans to come see patient.   8:06 AM Discussed with Dr. Read came down to see patient. Reviewed CT with radiology.   8:20 AM Level One STEMI called    Final Impression  70 year old female presents for evaluation of chest pain that awoke her from sleep around midnight, describes it as radiating to her back, between her shoulder blades and down her left arm.  Denies any falls, trauma, or other injuries.  Patient denies any history of heart disease or cardiac stenting, though admittedly does not sound like she is really had any cardiac testing done to do stress test or angiograms, has never seen a cardiologist for any reason that she can recall.  Patient was given 4 tabs of baby aspirin and 2 sprays of nitro en route by EMS, these have not resolved her pain.  Patient reports history of 42 years of smoking, though quit in 2013.  On initial evaluation patient appears very uncomfortable, holding left arm and left side of chest due to pain.  Initial EKG does not show any overt STEMI, though does have slight elevation in lead III, as well as new T wave inversion in lead III, though not meeting STEMI criteria.  Clinically, patient looks convincing for ACS given her description of pain, the pain appears to be in a fair amount of clinical discomfort    Patient's troponin returned at 269.  Patient still appearing very uncomfortable despite nitro and aspirin from EMS, as well as 75 mcg fentanyl here in the ED.  CTA negative for aortic dissection, though does show Enio to be some irregularity noncalcified plaque throughout the aorta.  Spoke with Dr. Read cardiology who came down to evaluate the patient, given patient's clinical exam,  history, and elevated troponin, they will plan on taking to the Cath Lab this morning, they will be ready in about 20 minutes.  Will make a Level One STEMI to make sure that patient gets there in an expeditious manner.    Prior to making a final disposition on this patient the results of patient's tests and other diagnostic studies were discussed with the patient. All questions were answered. Patient expressed understanding of the plan and was amenable to it.    Medical Decision Making  External Record(s) reviewed as documented below;  4/3/2025, [facility]rg[facility] Room visit, seen for hematochezia, note reviewed.  Chart documentation includes differential considered  EKGs or imaging independently interpreted my me (see Labs & Imaging and EKG sections).  Discussion of management with another provider: Cardiology, radiology  Care impacted by chronic illness: Reported ulcerative colitis on budesonide, dyslipidemia, depression, hypertension  Disposition considerations: Admit.    CT Pulmonary Angiogram:CT PA was ordered for reason(s) other than PE.    The patient has a STEMI     The patient was evaluated at 6:50 AM   Cath lab transfer was delayed due to time taken to rule out Aortic Dissection (JOANNE, CT)   ASA given by medics or taken today      Medications   heparin 25,000 units in 0.45% NaCl 250 mL ANTICOAGULANT infusion (950 Units/hr Intravenous $New Bag 7/15/25 0813)   nitroGLYcerin 50 mg in D5W 250 mL (adult std) infusion (10 mcg/min Intravenous $New Bag 7/15/25 0833)   rosuvastatin (CRESTOR) tablet 40 mg (has no administration in time range)   fentaNYL (PF) (SUBLIMAZE) injection 75 mcg (75 mcg Intravenous $Given 7/15/25 0734)   sodium chloride 0.9% BOLUS 1,000 mL (1,000 mLs Intravenous $New Bag 7/15/25 0733)   iopamidol (ISOVUE-370) solution 90 mL (90 mLs Intravenous $Given 7/15/25 0722)   heparin loading dose for LOW INTENSITY TREATMENT * Give BEFORE starting heparin infusion (4,650 Units Intravenous $Given  "7/15/25 0812)   sodium chloride 0.9% BOLUS 1,000 mL (1,000 mLs Intravenous $New Bag 7/15/25 0819)       Final Impression     1. ACS (acute coronary syndrome) (H)    2. Chest pain, unspecified type    3. Pain of left upper extremity    4. Elevated troponin        Critical Care     Performed by: Perez Lafleur MD  Authorized by: Perez Lafleur MD                   Total critical care time: 45 minutes  Critical care was necessary to treat or prevent imminent or life-threatening deterioration of the following conditions: ACS, elevated troponin requiring heparin drip, nitro drip, and transfer to Cath Lab  Critical care was time spent personally by me on the following activities: development of treatment plan with patient or surrogate, discussions with consultants, examination of patient, evaluation of patient's response to treatment, obtaining history from patient or surrogate, ordering and performing treatments and interventions, ordering and review of laboratory studies, ordering and review of radiographic studies, re-evaluation of patient's condition and monitoring for potential decompensation.  Critical care time was exclusive of separately billable procedures and treating other patients.    Chief Complaint     Chief Complaint   Patient presents with    Chest Pain     HPI     Arlene Becker is a 70 year old female with a relevant history of hypertension, hyperlipidemia, dyslipidemia, who presents for evaluation of chest, back, and left arm pain. Patient reports waking up around midnight (7/15/25) due to \"horrible\" pain in her chest, back, and left arm. The pain has kept her up all night, and she was brought in via EMS where they gave her 4x aspirin and 2 sprays of nitroglycerin, which were ineffective in alleviating pain.     Denies history of heart issues, and has never seen a cardiologist or had an angiogram. She has never experienced pain like this previously.     Of note, patient was a smoker from age " "16 and quit in 2013, so about 42 years of smoking previously.     Per chart review, patient is currently taking hydrochlorothiazide for hypertension.     I, Jisoo Yeom am serving as a scribe to document services personally performed by Dr. Perez Lafleur MD, based on my observation and the provider's statements to me. I, Dr. Perez Lafleur MD attest that Jisoo Yeom is acting in a scribe capacity, has observed my performance of the services and has documented them in accordance with my direction.    Physical Exam     /65   Pulse 68   Temp 97.9  F (36.6  C) (Oral)   Resp 23   Ht 1.422 m (4' 8\")   Wt 77.1 kg (170 lb)   SpO2 97%   BMI 38.11 kg/m    Constitutional: Awake, alert, appears uncomfortable due to chest pain, holding left side of chest  Head: Normocephalic, atraumatic.  ENT: Mucous membranes moist.  Eyes: Conjunctiva normal.  Respiratory: Respirations even, unlabored, in no acute respiratory distress.  Lungs clear to auscultation bilaterally.  Cardiovascular: Regular rate and rhythm. Good peripheral perfusion.  Good radial pulses in bilateral upper extremities, appears symmetrical  GI: Abdomen soft, non-tender.  No guarding or rebound.  No pulsatile masses.  Musculoskeletal: Moves all 4 extremities equally.  Integument: Warm, dry.  No diaphoresis  Neurologic: Alert & oriented x 3. Normal speech. Grossly normal motor and sensory function. No focal deficits noted.  Psychiatric: Normal mood    Labs & Imaging     Imaging reviewed and independently interpreted as below;   CTA chest abdomen pelvis images reviewed, no dissection seen, though does evidence of several areas of focal noncalcified plaque visible throughout the aorta.     Results for orders placed or performed during the hospital encounter of 07/15/25   CTA Chest Abdomen Pelvis w Contrast    Impression    IMPRESSION:  1.  Mixed calcified and noncalcified moderate atherosclerosis of the thoracoabdominal aorta with areas of resultant " luminal irregularity; however, no thoracoabdominal aortic aneurysm or dissection. Mild coronary artery calcifications.  2.  Minimal centrilobular emphysema and findings suggesting infectious or inflammatory bronchitis. Mosaic attenuation in the lungs, probable small airways disease. No findings to suggest pneumonia or pulmonary edema.  3.  Thyroid nodules measuring greater than 1 cm for which follow-up with nonemergent outpatient thyroid sonogram is recommended, unless recently performed.  4.  Cholecystectomy. No biliary ductal dilation.  5.  Noninflamed colonic diverticulosis and additional incidental/chronic findings, as detailed.    Findings were discussed with Dr. Strauss at the time of dictation.     Extra Blue Top Tube   Result Value Ref Range    Hold Specimen JIC    Extra Red Top Tube   Result Value Ref Range    Hold Specimen JIC    Extra Green Top (Lithium Heparin) Tube   Result Value Ref Range    Hold Specimen     Extra Purple Top Tube   Result Value Ref Range    Hold Specimen     Basic metabolic panel   Result Value Ref Range    Sodium 136 135 - 145 mmol/L    Potassium 3.4 3.4 - 5.3 mmol/L    Chloride 97 (L) 98 - 107 mmol/L    Carbon Dioxide (CO2) 25 22 - 29 mmol/L    Anion Gap 14 7 - 15 mmol/L    Urea Nitrogen 26.2 (H) 8.0 - 23.0 mg/dL    Creatinine 0.92 0.51 - 0.95 mg/dL    GFR Estimate 66 >60 mL/min/1.73m2    Calcium 9.3 8.8 - 10.4 mg/dL    Glucose 115 (H) 70 - 99 mg/dL   NT-proBNP   Result Value Ref Range    NT-proBNP 207 0 - 353 pg/mL   Result Value Ref Range    Troponin T, High Sensitivity 269 (HH) <=14 ng/L   CBC with platelets and differential   Result Value Ref Range    WBC Count 7.7 4.0 - 11.0 10e3/uL    RBC Count 4.17 3.80 - 5.20 10e6/uL    Hemoglobin 10.3 (L) 11.7 - 15.7 g/dL    Hematocrit 32.9 (L) 35.0 - 47.0 %    MCV 79 78 - 100 fL    MCH 24.7 (L) 26.5 - 33.0 pg    MCHC 31.3 (L) 31.5 - 36.5 g/dL    RDW 17.1 (H) 10.0 - 15.0 %    Platelet Count 323 150 - 450 10e3/uL    % Neutrophils 52 %    %  Lymphocytes 35 %    % Monocytes 9 %    % Eosinophils 3 %    % Basophils 1 %    % Immature Granulocytes 0 %    NRBCs per 100 WBC 0 <1 /100    Absolute Neutrophils 4.0 1.6 - 8.3 10e3/uL    Absolute Lymphocytes 2.7 0.8 - 5.3 10e3/uL    Absolute Monocytes 0.7 0.0 - 1.3 10e3/uL    Absolute Eosinophils 0.3 0.0 - 0.7 10e3/uL    Absolute Basophils 0.1 0.0 - 0.2 10e3/uL    Absolute Immature Granulocytes 0.0 <=0.4 10e3/uL    Absolute NRBCs 0.0 10e3/uL   Creatinine POCT   Result Value Ref Range    Creatinine POCT 1.1 (H) 0.5 - 1.0 mg/dL    GFR, ESTIMATED POCT 53 (L) >60 mL/min/1.73m2   CBC with platelets   Result Value Ref Range    WBC Count 6.8 4.0 - 11.0 10e3/uL    RBC Count 3.87 3.80 - 5.20 10e6/uL    Hemoglobin 9.5 (L) 11.7 - 15.7 g/dL    Hematocrit 30.9 (L) 35.0 - 47.0 %    MCV 80 78 - 100 fL    MCH 24.5 (L) 26.5 - 33.0 pg    MCHC 30.7 (L) 31.5 - 36.5 g/dL    RDW 17.2 (H) 10.0 - 15.0 %    Platelet Count 298 150 - 450 10e3/uL   ECG 12-LEAD WITH MUSE (LHE)   Result Value Ref Range    Systolic Blood Pressure  mmHg    Diastolic Blood Pressure  mmHg    Ventricular Rate 75 BPM    Atrial Rate 75 BPM    IA Interval 158 ms    QRS Duration 74 ms     ms    QTc 446 ms    P Axis 30 degrees    R AXIS -12 degrees    T Axis 62 degrees    Interpretation ECG       Sinus rhythm  Nonspecific ST abnormality  Abnormal ECG  When compared with ECG of 04-Nov-2023 11:57,  ST elevation now present in Inferior leads  Confirmed by SEE ED PROVIDER NOTE FOR, ECG INTERPRETATION ([id number]),  Sabrina Molina ([id number]) on 7/15/2025 8:02:24 AM         EKG     EKG reviewed and independently interpreted as below;  Sinus rhythm, rate 75.  .  QRS 74.  QTc 446.  Some borderline isolated elevation in lead III, as well as newly inverted T wave in lead III, though would not consider this to meet STEMI criteria.    Procedures          Perez Lafleur MD  07/15/25 0843

## 2025-07-15 NOTE — Clinical Note
Stent deployed in the middle right coronary artery. Max pressure = 12 leesa. Total duration = 15 seconds.

## 2025-07-15 NOTE — PLAN OF CARE
Problem: Cardiac Catheterization (Diagnostic/Interventional)  Goal: Absence of Bleeding  Outcome: Progressing     Problem: Chest Pain  Goal: Resolution of Chest Pain Symptoms  Outcome: Progressing   Goal Outcome Evaluation:       Pt arrived to unit around 1200 this afternoon. AOX4. Denies pain, numbness/tingling, and SOB. RR site WDL other than ecchymotic. Pt SBA. RA. VSS. Pt able to make needs known.       -1500: Pt c/o chest pain rated 6/10 and compared it to hr pain prior to getting the angio. Dr. Freire notified and paged cards. Two doses of Nitroglycerin administered. After first dose chest pain was 4/10 and after second dose pt denied pain. Cards came and assessed Pt. Trop and EKG ordered. Trop 998, cards notified. Repeat Trop at 1730, 1,126.

## 2025-07-15 NOTE — CONSULTS
"      Cardiology Consult Note    Thank you, Dr. Lafleur, for asking the [facility] Care team to see Arlene Becker in consultation at [facility] to evaluate chest and back pain with elevated troponin.      Assessment:   1.  Severe chest, back and left arm pain with associated troponin elevation, most likely consistent with non-ST elevation MI.  ECG with subtle coving ST changes in the inferior leads but no significant ST depression or elevation.  Chest CT shows atheromatous plaque around the area of the aortic arch and descending aorta but negative for penetrating ulcer or dissection per radiology.  Will initiate heparin bolus along with IV nitroglycerin but favor urgent transfer to [facility] for diagnostic angiography.  I have discussed the procedure with her including risks and benefits and she is agreeable to proceed.  2.  Hypertension, controlled  3.  Mixed hyperlipidemia, on low-dose pravastatin with recent lipid profile demonstrating a total cholesterol 285, triglycerides 163, HDL 57 .  Will discontinue pravastatin and begin rosuvastatin 40 mg daily.  4.  Mild hyperglycemia.  This may be stress related.  Consider hemoglobin A1c    Clinically Significant Risk Factors Present on Admission          # Hypochloremia: Lowest Cl = 97 mmol/L in last 2 days, will monitor as appropriate               # Anemia: based on hgb <11       # Obesity: Estimated body mass index is 38.11 kg/m  as calculated from the following:    Height as of this encounter: 1.422 m (4' 8\").    Weight as of this encounter: 77.1 kg (170 lb).                Plan:   1.  Initiate weight-based heparin along with IV nitroglycerin.  Hold Brilinta at this time as patient may have multivessel disease  2.  Begin rosuvastatin 40 mg daily to treat lipids  3.  Urgent transfer to [facility] for diagnostic coronary angiography.     Primary cardiologist: None prior to admission     Current History:   Ms. Arlene FAULKNER " Eugenio is a 70 year old female with prior history of tobacco use, hypertension, mixed hyperlipidemia, family history of coronary artery disease who presented to the ED this morning for evaluation of chest discomfort.  Patient states she was awakened around midnight with severe pressure in her chest as well as into her back radiating into the left arm.  Denied any associated diaphoresis, shortness of breath or nausea.  Pain has persisted all night.  She did not seek medical attention right away as she was hoping the pain would subside.  Because of ongoing symptoms, she subsequently presented to the ED.  Was given 2 nitro sprays and route with slight decrease in the pain although she still rates it as 6 out of 10 in intensity at the time of my interview.  Continues to have pain into the back.  No prior history of similar symptoms.  ECG here demonstrates nonspecific ST changes.  Troponin elevated at 269.  Stat chest CT negative for aortic dissection or penetrating ulcer although she does have significant plaque in her aorta.    Past Medical History:   -Hypertension  - Mixed hyperlipidemia  - Prior tobacco use    Past Surgical History:     Past Surgical History:   Procedure Laterality Date     SECTION      HYSTERECTOMY      LAPAROSCOPIC CHOLECYSTECTOMY N/A 2023    Procedure: CHOLECYSTECTOMY, LAPAROSCOPIC;  Surgeon: Perfecto Major MD;  Location: [facility] OR    RELEASE CARPAL TUNNEL      TEMPOROMANDIBULAR JOINT ARTHROPLASTY         Family History:     Family History   Problem Relation Age of Onset    Coronary Artery Disease Mother     Coronary Artery Disease Father  62 of a massive MI    Coronary Artery Disease Brother         stent   -    Coronary artery disease                            Sister                                   massive MI at age 57    Social History:    reports that she quit smoking about 12 years ago. She does not have any smokeless tobacco history on file.    Meds:      Current Facility-Administered Medications:     heparin 25,000 units in 0.45% NaCl 250 mL ANTICOAGULANT infusion, 0-5,000 Units/hr, Intravenous, Continuous, Perez Lafleur MD, Last Rate: 9.5 mL/hr at 07/15/25 0813, 950 Units/hr at 07/15/25 0813    nitroGLYcerin 50 mg in D5W 250 mL (adult std) infusion,  mcg/min, Intravenous, Continuous, Perez Lafleur MD    sodium chloride 0.9% BOLUS 1,000 mL, 1,000 mL, Intravenous, Once, Perez Lafleur MD, Last Rate: 1,000 mL/hr at 07/15/25 0733, 1,000 mL at 07/15/25 0733    Current Outpatient Medications:     budesonide (ENTOCORT EC) 3 mg 24 hr capsule, [BUDESONIDE (ENTOCORT EC) 3 MG 24 HR CAPSULE] Take 3 mg by mouth every morning., Disp: , Rfl:     calcium-vitamin D (CALCIUM-VITAMIN D) 500 mg(1,250mg) -200 unit per tablet, [CALCIUM-VITAMIN D (CALCIUM-VITAMIN D) 500 MG(1,250MG) -200 UNIT PER TABLET] Take 2 tablets by mouth daily., Disp: , Rfl:     esomeprazole (NEXIUM) 20 MG capsule, [ESOMEPRAZOLE (NEXIUM) 20 MG CAPSULE] Take 20 mg by mouth every morning before breakfast., Disp: , Rfl:     hydroCHLOROthiazide (HYDRODIURIL) 12.5 MG tablet, [HYDROCHLOROTHIAZIDE (HYDRODIURIL) 12.5 MG TABLET] Take 12.5 mg by mouth 2 (two) times a day at 9am and 6pm., Disp: , Rfl:     ondansetron (ZOFRAN ODT) 4 MG ODT tab, Take 1 tablet (4 mg) by mouth every 8 hours as needed for nausea, Disp: 8 tablet, Rfl: 0    pravastatin (PRAVACHOL) 20 MG tablet, [PRAVASTATIN (PRAVACHOL) 20 MG TABLET] Take 20 mg by mouth at bedtime., Disp: , Rfl:     rosuvastatin (CRESTOR) 5 MG tablet, [ROSUVASTATIN (CRESTOR) 5 MG TABLET] Take 5 mg by mouth bedtime., Disp: , Rfl:     spironolactone (ALDACTONE) 25 MG tablet, [SPIRONOLACTONE (ALDACTONE) 25 MG TABLET] Take 25 mg by mouth daily., Disp: , Rfl:     venlafaxine (EFFEXOR-XR) 75 MG 24 hr capsule, [VENLAFAXINE (EFFEXOR-XR) 75 MG 24 HR CAPSULE] Take 75 mg by mouth daily., Disp: , Rfl:   Current Facility-Administered Medications   Medication Dose Route  "Frequency Provider Last Rate Last Admin    sodium chloride 0.9% BOLUS 1,000 mL  1,000 mL Intravenous Once Perez Lafleur MD 1,000 mL/hr at 07/15/25 0733 1,000 mL at 07/15/25 0733       Allergies:   Sulfa (sulfonamide antibiotics) [sulfa antibiotics]    Review of Systems:   A 12 point comprehensive review of systems was negative except as noted in the current history.    Objective:      Physical Exam  Body mass index is 38.11 kg/m .  /59   Pulse 74   Temp 97.9  F (36.6  C) (Oral)   Resp 29   Ht 1.422 m (4' 8\")   Wt 77.1 kg (170 lb)   SpO2 95%   BMI 38.11 kg/m      General Appearance:   Well-developed, obese female in moderate distress due to ongoing chest and back pain   HEENT:  Normocephalic, atraumatic.  Sclera nonicteric.  Mucous membranes moist   Neck: No jugular venous distention   Chest: Symmetric with normal AP diameter   Lungs:   Clear to auscultation bilaterally.   Cardiovascular:   Regular rate and rhythm.  S1, S2 normal.  No murmur or gallop   Abdomen:  Obese, soft, nondistended, nontender.  No organomegaly or mass   Extremities: No peripheral edema, clubbing or cyanosis   Skin: No rash or lesions   Neurologic: Alert and oriented x 3.  No gross focal deficit             Cardiographics (personally reviewed)  ECG demonstrates sinus rhythm with nonspecific T wave abnormality    Imaging (personally reviewed:  Verbal report from radiology is that there is evidence of calcified and noncalcified plaque in the aortic arch and descending aorta but no evidence of dissection or ulcerative penetration.    Lab Review (personally reviewed all results)  Recent Labs   Lab Test 06/18/25  1029   CHOL 285*   HDL 57   *   TRIG 163*     Recent Labs   Lab Test 06/18/25  1029 04/11/24  1527 03/07/23  1418   * 114* 140*     Recent Labs   Lab Test 07/15/25  0713 07/15/25  0654   NA  --  136   POTASSIUM  --  3.4   CHLORIDE  --  97*   CO2  --  25   GLC  --  115*   BUN  --  26.2*   CR 1.1* 0.92 " "  GFRESTIMATED 53* 66   JELLY  --  9.3     Recent Labs   Lab Test 07/15/25  0713 07/15/25  0654 06/18/25  1029   CR 1.1* 0.92 0.88     No results for input(s): \"A1C\" in the last 96431 hours.       Recent Labs   Lab Test 07/15/25  0654   WBC 7.7   HGB 10.3*   HCT 32.9*   MCV 79        Recent Labs   Lab Test 07/15/25  0654 06/18/25  1029 06/11/24  1004   HGB 10.3* 11.3* 12.1    No results for input(s): \"TROPONINI\" in the last 62053 hours.  Recent Labs   Lab Test 07/15/25  0654   NTBNP 207     Recent Labs   Lab Test 06/18/25  1029   TSH 7.18*     No results for input(s): \"INR\" in the last 10030 hours.              "

## 2025-07-15 NOTE — PHARMACY-ADMISSION MEDICATION HISTORY
Pharmacist Admission Medication History    Admission medication history is complete. The information provided in this note is only as accurate as the sources available at the time of the update.    Information Source(s): Patient via in-person    Pertinent Information: None    Changes made to PTA medication list:  Added: Acetaminophen, Topiramate, Vitamin D, Rizatriptan  Deleted: Esomeprazole  Changed: hydrochlorothiazide, Spironolactone    Allergies reviewed with patient and updates made in EHR: yes    Medication History Completed By: Ariadna Mckeon [facility] 7/15/2025 1:29 PM    PTA Med List   Medication Sig Last Dose/Taking    acetaminophen (TYLENOL) 500 MG tablet Take 500-1,000 mg by mouth every 4 hours as needed for mild pain or fever. Taking As Needed    [START ON 7/16/2025] aspirin 81 MG EC tablet Take 1 tablet (81 mg) by mouth daily. Start tomorrow. Taking    budesonide (ENTOCORT EC) 3 mg 24 hr capsule [BUDESONIDE (ENTOCORT EC) 3 MG 24 HR CAPSULE] Take 3 mg by mouth every morning. 7/14/2025 Morning    calcium-vitamin D (CALCIUM-VITAMIN D) 500 mg(1,250mg) -200 unit per tablet [CALCIUM-VITAMIN D (CALCIUM-VITAMIN D) 500 MG(1,250MG) -200 UNIT PER TABLET] Take 2 tablets by mouth daily. 7/14/2025 Morning    [START ON 7/16/2025] clopidogrel (PLAVIX) 75 MG tablet Take 1 tablet (75 mg) by mouth daily. Dose to start tomorrow. Taking    hydrochlorothiazide (HYDRODIURIL) 25 MG tablet Take 25 mg by mouth every morning. 7/14/2025 Morning    omeprazole (PRILOSEC) 20 MG DR capsule Take 20 mg by mouth daily. 7/14/2025 Morning    ondansetron (ZOFRAN ODT) 4 MG ODT tab Take 1 tablet (4 mg) by mouth every 8 hours as needed for nausea Taking As Needed    rizatriptan (MAXALT) 10 MG tablet Take 10 mg by mouth at onset of headache for migraine. Taking As Needed    rosuvastatin (CRESTOR) 40 MG tablet Take 1 tablet (40 mg) by mouth daily. Taking    spironolactone (ALDACTONE) 50 MG tablet Take 50 mg by mouth daily. 7/14/2025 Morning     topiramate (TOPAMAX) 50 MG tablet 1/2 tablet (25 mg) x1 week, then 1 tablet (50 mg) Orally Once a day at bedtime; Duration: 90 days 7/14/2025 Evening    venlafaxine (EFFEXOR-XR) 75 MG 24 hr capsule [VENLAFAXINE (EFFEXOR-XR) 75 MG 24 HR CAPSULE] Take 75 mg by mouth daily. 7/14/2025 Morning    vitamin D3 (CHOLECALCIFEROL) 50 mcg (2000 units) tablet Take 1 tablet by mouth daily. 7/14/2025 Morning

## 2025-07-15 NOTE — Clinical Note
Stent deployed in the proximal right coronary artery. Max pressure = 14 leesa. Total duration = 12 seconds.

## 2025-07-15 NOTE — Clinical Note
Potential access sites were evaluated for patency using ultrasound.   The right radial artery was selected.

## 2025-07-16 ENCOUNTER — APPOINTMENT (OUTPATIENT)
Dept: OCCUPATIONAL THERAPY | Facility: HOSPITAL | Age: 70
DRG: 322 | End: 2025-07-16
Attending: NURSE PRACTITIONER
Payer: COMMERCIAL

## 2025-07-16 ENCOUNTER — DOCUMENTATION ONLY (OUTPATIENT)
Dept: OTHER | Facility: CLINIC | Age: 70
End: 2025-07-16
Payer: COMMERCIAL

## 2025-07-16 VITALS
BODY MASS INDEX: 39.23 KG/M2 | HEART RATE: 62 BPM | DIASTOLIC BLOOD PRESSURE: 65 MMHG | TEMPERATURE: 97.5 F | RESPIRATION RATE: 20 BRPM | OXYGEN SATURATION: 93 % | SYSTOLIC BLOOD PRESSURE: 120 MMHG | WEIGHT: 175 LBS

## 2025-07-16 VITALS — SYSTOLIC BLOOD PRESSURE: 142 MMHG | DIASTOLIC BLOOD PRESSURE: 85 MMHG

## 2025-07-16 PROBLEM — I25.119 CORONARY ARTERY DISEASE INVOLVING NATIVE CORONARY ARTERY OF NATIVE HEART WITH ANGINA PECTORIS: Status: ACTIVE | Noted: 2025-07-16

## 2025-07-16 PROBLEM — K21.00 GASTROESOPHAGEAL REFLUX DISEASE WITH ESOPHAGITIS, UNSPECIFIED WHETHER HEMORRHAGE: Status: ACTIVE | Noted: 2025-07-16

## 2025-07-16 PROBLEM — I21.3 ST ELEVATION MI (STEMI) (H): Status: ACTIVE | Noted: 2025-07-16

## 2025-07-16 LAB
ANION GAP SERPL CALCULATED.3IONS-SCNC: 9 MMOL/L (ref 7–15)
ATRIAL RATE - MUSE: 59 BPM
ATRIAL RATE - MUSE: 66 BPM
BUN SERPL-MCNC: 15.2 MG/DL (ref 8–23)
CALCIUM SERPL-MCNC: 9.1 MG/DL (ref 8.8–10.4)
CHLORIDE SERPL-SCNC: 93 MMOL/L (ref 98–107)
CREAT SERPL-MCNC: 0.66 MG/DL (ref 0.51–0.95)
DIASTOLIC BLOOD PRESSURE - MUSE: NORMAL MMHG
DIASTOLIC BLOOD PRESSURE - MUSE: NORMAL MMHG
EGFRCR SERPLBLD CKD-EPI 2021: >90 ML/MIN/1.73M2
ERYTHROCYTE [DISTWIDTH] IN BLOOD BY AUTOMATED COUNT: 16.6 % (ref 10–15)
GLUCOSE SERPL-MCNC: 113 MG/DL (ref 70–99)
HCO3 SERPL-SCNC: 29 MMOL/L (ref 22–29)
HCT VFR BLD AUTO: 30.5 % (ref 35–47)
HGB BLD-MCNC: 9.6 G/DL (ref 11.7–15.7)
INTERPRETATION ECG - MUSE: NORMAL
INTERPRETATION ECG - MUSE: NORMAL
MCH RBC QN AUTO: 24.3 PG (ref 26.5–33)
MCHC RBC AUTO-ENTMCNC: 31.5 G/DL (ref 31.5–36.5)
MCV RBC AUTO: 77 FL (ref 78–100)
P AXIS - MUSE: 12 DEGREES
P AXIS - MUSE: 58 DEGREES
PLATELET # BLD AUTO: 272 10E3/UL (ref 150–450)
POTASSIUM SERPL-SCNC: 3.5 MMOL/L (ref 3.4–5.3)
PR INTERVAL - MUSE: 168 MS
PR INTERVAL - MUSE: 180 MS
QRS DURATION - MUSE: 74 MS
QRS DURATION - MUSE: 76 MS
QT - MUSE: 462 MS
QT - MUSE: 494 MS
QTC - MUSE: 484 MS
QTC - MUSE: 489 MS
R AXIS - MUSE: -11 DEGREES
R AXIS - MUSE: -24 DEGREES
RBC # BLD AUTO: 3.95 10E6/UL (ref 3.8–5.2)
SODIUM SERPL-SCNC: 131 MMOL/L (ref 135–145)
SYSTOLIC BLOOD PRESSURE - MUSE: NORMAL MMHG
SYSTOLIC BLOOD PRESSURE - MUSE: NORMAL MMHG
T AXIS - MUSE: -2 DEGREES
T AXIS - MUSE: 28 DEGREES
TROPONIN T SERPL HS-MCNC: 1020 NG/L
TROPONIN T SERPL HS-MCNC: 1217 NG/L
VENTRICULAR RATE- MUSE: 59 BPM
VENTRICULAR RATE- MUSE: 66 BPM
WBC # BLD AUTO: 9.2 10E3/UL (ref 4–11)

## 2025-07-16 PROCEDURE — G0378 HOSPITAL OBSERVATION PER HR: HCPCS

## 2025-07-16 PROCEDURE — 250N000013 HC RX MED GY IP 250 OP 250 PS 637: Performed by: HOSPITALIST

## 2025-07-16 PROCEDURE — 97535 SELF CARE MNGMENT TRAINING: CPT | Mod: GO

## 2025-07-16 PROCEDURE — 97110 THERAPEUTIC EXERCISES: CPT | Mod: GO

## 2025-07-16 PROCEDURE — 84484 ASSAY OF TROPONIN QUANT: CPT | Performed by: STUDENT IN AN ORGANIZED HEALTH CARE EDUCATION/TRAINING PROGRAM

## 2025-07-16 PROCEDURE — 36415 COLL VENOUS BLD VENIPUNCTURE: CPT | Performed by: NURSE PRACTITIONER

## 2025-07-16 PROCEDURE — 99239 HOSP IP/OBS DSCHRG MGMT >30: CPT | Performed by: INTERNAL MEDICINE

## 2025-07-16 PROCEDURE — 99233 SBSQ HOSP IP/OBS HIGH 50: CPT | Mod: FS | Performed by: INTERNAL MEDICINE

## 2025-07-16 PROCEDURE — 93010 ELECTROCARDIOGRAM REPORT: CPT | Performed by: INTERNAL MEDICINE

## 2025-07-16 PROCEDURE — 97165 OT EVAL LOW COMPLEX 30 MIN: CPT | Mod: GO

## 2025-07-16 PROCEDURE — 999N000147 HC STATISTIC PT IP EVAL DEFER

## 2025-07-16 PROCEDURE — 99207 PR APP CREDIT; MD BILLING SHARED VISIT: CPT | Mod: FS | Performed by: STUDENT IN AN ORGANIZED HEALTH CARE EDUCATION/TRAINING PROGRAM

## 2025-07-16 PROCEDURE — 80048 BASIC METABOLIC PNL TOTAL CA: CPT | Performed by: NURSE PRACTITIONER

## 2025-07-16 PROCEDURE — 85027 COMPLETE CBC AUTOMATED: CPT | Performed by: EMERGENCY MEDICINE

## 2025-07-16 PROCEDURE — 36415 COLL VENOUS BLD VENIPUNCTURE: CPT | Performed by: STUDENT IN AN ORGANIZED HEALTH CARE EDUCATION/TRAINING PROGRAM

## 2025-07-16 PROCEDURE — 93005 ELECTROCARDIOGRAM TRACING: CPT

## 2025-07-16 PROCEDURE — 250N000013 HC RX MED GY IP 250 OP 250 PS 637: Performed by: EMERGENCY MEDICINE

## 2025-07-16 PROCEDURE — 250N000011 HC RX IP 250 OP 636: Performed by: NURSE PRACTITIONER

## 2025-07-16 PROCEDURE — 250N000013 HC RX MED GY IP 250 OP 250 PS 637: Performed by: NURSE PRACTITIONER

## 2025-07-16 RX ORDER — NALOXONE HYDROCHLORIDE 0.4 MG/ML
0.2 INJECTION, SOLUTION INTRAMUSCULAR; INTRAVENOUS; SUBCUTANEOUS
Status: DISCONTINUED | OUTPATIENT
Start: 2025-07-16 | End: 2025-07-16 | Stop reason: HOSPADM

## 2025-07-16 RX ORDER — BUDESONIDE 3 MG/1
6 CAPSULE, COATED PELLETS ORAL DAILY
COMMUNITY
Start: 2025-05-28 | End: 2025-07-16

## 2025-07-16 RX ORDER — NALOXONE HYDROCHLORIDE 0.4 MG/ML
0.4 INJECTION, SOLUTION INTRAMUSCULAR; INTRAVENOUS; SUBCUTANEOUS
Status: DISCONTINUED | OUTPATIENT
Start: 2025-07-16 | End: 2025-07-16 | Stop reason: HOSPADM

## 2025-07-16 RX ORDER — OMEPRAZOLE 20 MG/1
20-40 TABLET, DELAYED RELEASE ORAL DAILY
Status: ON HOLD | COMMUNITY
End: 2025-07-16

## 2025-07-16 RX ORDER — METOPROLOL SUCCINATE 25 MG/1
75 TABLET, EXTENDED RELEASE ORAL DAILY
Qty: 270 TABLET | Refills: 0 | Status: SHIPPED | OUTPATIENT
Start: 2025-07-16 | End: 2025-10-14

## 2025-07-16 RX ORDER — PANTOPRAZOLE SODIUM 40 MG/1
40 TABLET, DELAYED RELEASE ORAL DAILY
Qty: 90 TABLET | Refills: 0 | Status: SHIPPED | OUTPATIENT
Start: 2025-07-16 | End: 2025-10-14

## 2025-07-16 RX ADMIN — ASPIRIN 81 MG: 81 TABLET, COATED ORAL at 08:27

## 2025-07-16 RX ADMIN — ONDANSETRON 4 MG: 4 TABLET, ORALLY DISINTEGRATING ORAL at 08:27

## 2025-07-16 RX ADMIN — Medication 5 MG: at 00:53

## 2025-07-16 RX ADMIN — CLOPIDOGREL 75 MG: 75 TABLET ORAL at 08:27

## 2025-07-16 RX ADMIN — SPIRONOLACTONE 25 MG: 25 TABLET, FILM COATED ORAL at 08:27

## 2025-07-16 RX ADMIN — HYDROCHLOROTHIAZIDE 12.5 MG: 12.5 TABLET ORAL at 08:28

## 2025-07-16 RX ADMIN — PANTOPRAZOLE SODIUM 40 MG: 40 TABLET, DELAYED RELEASE ORAL at 08:27

## 2025-07-16 RX ADMIN — METOPROLOL TARTRATE 25 MG: 25 TABLET, FILM COATED ORAL at 08:27

## 2025-07-16 RX ADMIN — VENLAFAXINE HYDROCHLORIDE 75 MG: 75 CAPSULE, EXTENDED RELEASE ORAL at 08:27

## 2025-07-16 RX ADMIN — ACETAMINOPHEN 650 MG: 325 TABLET ORAL at 08:26

## 2025-07-16 RX ADMIN — ROSUVASTATIN 40 MG: 40 TABLET, FILM COATED ORAL at 08:27

## 2025-07-16 ASSESSMENT — ACTIVITIES OF DAILY LIVING (ADL)
ADLS_ACUITY_SCORE: 26
ADLS_ACUITY_SCORE: 38
ADLS_ACUITY_SCORE: 26
ADLS_ACUITY_SCORE: 30
ADLS_ACUITY_SCORE: 26
ADLS_ACUITY_SCORE: 38
ADLS_ACUITY_SCORE: 30
IADL_COMMENTS: IND. W/ IADL ROUTINE AT BASELINE
ADLS_ACUITY_SCORE: 38
ADLS_ACUITY_SCORE: 30
ADLS_ACUITY_SCORE: 26
ADLS_ACUITY_SCORE: 26

## 2025-07-16 NOTE — PLAN OF CARE
Problem: Cardiac Catheterization (Diagnostic/Interventional)  Goal: Absence of Bleeding  Outcome: Progressing     Problem: Cardiac Catheterization (Diagnostic/Interventional)  Goal: Stable Heart Rate and Rhythm  Outcome: Progressing     Problem: Cardiac Catheterization (Diagnostic/Interventional)  Goal: Absence of Embolism Signs and Symptoms  Outcome: Progressing   Goal Outcome Evaluation:    A/O x4, forgetful. Denies chest pain, general pain, or sob. Site is bruise, but no bleeding. CMS intact. VSS, room air. PRN melatonin 5 mg given to help with sleep. Pt c/o of nausea, Zofran given with effectiveness. Ice pack given, pt reported feeling cooler. Call light within reach. Bed alarm for safety. Able to make needs known.

## 2025-07-16 NOTE — DISCHARGE INSTRUCTIONS

## 2025-07-16 NOTE — PLAN OF CARE
Assumed care 1900 to 2330. A&O x 4. Stand by assist. Tele is NSR. Denies pain. Room air. Up to toilet. Nephew at bedside in the evening, supportive of patient. Call light within reach, able to make needs known. Bed alarm on for safety.    Problem: Cardiac Catheterization (Diagnostic/Interventional)  Goal: Absence of Vascular Access Complication  Intervention: Prevent and Manage Access Complications  Recent Flowsheet Documentation  Taken 7/15/2025 2101 by Lovely Lopez RN  Activity Management: activity adjusted per tolerance  Head of Bed (HOB) Positioning: HOB at 20-30 degrees     Problem: Chest Pain  Goal: Resolution of Chest Pain Symptoms  Outcome: Progressing

## 2025-07-16 NOTE — PROGRESS NOTES
[facility] [id number]  [place] [address]   [email or web address]   OFFICE: [phone]     CARDIOLOGY FOLLOW-UP NOTE      Impression and Plan     Impression:   NSTEMI/coronary artery disease: Coronary angiogram 7/15/2025 showed acute thrombotic plaque rupture involving mid RCA leading to SALLY x 2.  Mild nonobstructive disease elsewhere.  Patient's troponin initially elevated at 269 --> 998.   Chest pain: 7/15/2025 around 2:30 PM patient developed chest pain that was completely relieved with 2 nitroglycerin.  EKG without changes.  Troponin did continue to climb to 1126 though suspect this had not reached its peak as it again deborah to 1316 in the absence of symptoms.  Patient did not look acutely ill.  No recurrence of this chest discomfort.  Paroxysmal VT: 15 beats seen this morning on telemetry which patient was asymptomatic for.  Normal LVEF.  Will increase metoprolol.  Hypertension: Has been fairly well-controlled  Hyperlipidemia:  6/18/2025.  Was previously on pravastatin and now switched to rosuvastatin 40 mg daily.  Plan to recheck cholesterol in 3 months    Plan:  Repeat troponin to make sure down trending at this point   Continue DAPT therapy with Plavix and aspirin for minimum of 1 year then continuing aspirin indefinitely  Change metoprolol to tartrate to metoprolol succinate and increase to 75 mg daily given 15 beats of VT seen on telemetry  Continue hydrochlorothiazide 12.5 mg twice daily and spironolactone 25 mg daily  Continue rosuvastatin 40 mg daily and repeat lipids in 3 months  Cardiac rehab outpatient  Follow-up with general cardiology ADRIANO in 7 to 10 days  Follow-up with Dr. Read in 2 to 3 months    Primary Cardiologist: None.  Can establish with Dr. Read    History/subjective     Arlene Becker is a 68-year-old female with past medical history of hypertension, hyperlipidemia, tobacco use, family history of coronary artery disease who  presented to the  with chest discomfort that occurred around midnight and radiated into the left arm.  Troponin elevated at 269 and EKG with nonspecific ST changes.  Patient was transferred to [facility] and underwent coronary angiogram 7/15 that showed acute thrombotic plaque rupture involving mid RCA mild nonobstructive disease elsewhere.  This led to SALLY x 2 to RCA.    Yesterday patient had complained of chest pain that felt similar to the pain she had prior to the stent placements.  It was relieved with 2 nitroglycerin with patient then reporting 0 out of 10 discomfort.  Patient denies any recurrence of this.  EKG was obtained at the time without changes.  Troponin did continue to rise though suspect had not reached peak.    Patient denies any recurrence of the chest discomfort she had yesterday.  Denies shortness of breath, lower extremity edema, palpitations, racing heart sensation.  Patient notes her  passed away in December.  She was his primary caretaker.  She notes fatigue since she started taking care of him.  Wants to start walking again but denies any structured activity at this time.      Cardiac Diagnostics   Telemetry (personally reviewed): Normal sinus rhythm with 15 beats of VT    Echocardiogram 7/15/2025  Left ventricular size, wall motion and function are normal. The ejection  fraction is 55-60%. Despite the use of contrast, the posterior wall is not  well visualized.  Normal right ventricle size and systolic function.  The left atrium is mildly dilated.  No hemodynamically significant valvular abnormalities on 2D or color flow  imaging.  There is no comparison study available.      Cardiac Cath 7/15/2025 (results reviewed):  Acute thrombotic plaque rupture involving the mid RCA with mild nonobstructive disease elsewhere, post successful PCI with a 4.0 x 24 and a 4.0 x 48 mm Synergy XD drug-eluting stents.  Mildly elevated left-sided filling pressures.  No aortic valve  stenosis.     RECOMMENDATIONS:   Usual postprocedure cares, please see orders.  DAPT for minimum of 1 year.  Aggressive risk factor modification for secondary prevention.      Physical Examination       BP (!) 140/69 (BP Location: Right arm)   Pulse 60   Temp 97.5  F (36.4  C) (Oral)   Resp 20   Wt 79.4 kg (175 lb)   SpO2 93%   BMI 39.23 kg/m                Intake/Output Summary (Last 24 hours) at 7/16/2025 0808  Last data filed at 7/16/2025 0350  Gross per 24 hour   Intake 270 ml   Output --   Net 270 ml       General: pleasant female. No acute distress.   HENT: external ears normal. Nares patent. Mucous membranes moist.  Eyes: perrla, extraocular muscles intact. No scleral icterus.   Neck: No JVD  Lungs: clear to auscultation  COR:  regular rate and rhythm, No murmurs, rubs, or gallops  Abd: nondistended, BS present  Extrem: No edema         Imaging      CTA chest abdomen pelvis 7/15/2025  1.  Mixed calcified and noncalcified moderate atherosclerosis of the thoracoabdominal aorta with areas of resultant luminal irregularity; however, no thoracoabdominal aortic aneurysm or dissection. Mild coronary artery calcifications.  2.  Minimal centrilobular emphysema and findings suggesting infectious or inflammatory bronchitis. Mosaic attenuation in the lungs, probable small airways disease. No findings to suggest pneumonia or pulmonary edema.  3.  Thyroid nodules measuring greater than 1 cm for which follow-up with nonemergent outpatient thyroid sonogram is recommended, unless recently performed.  4.  Cholecystectomy. No biliary ductal dilation.  5.  Noninflamed colonic diverticulosis and additional incidental/chronic findings, as detailed.       Lab Results   Lab Results   Component Value Date    CHOL 285 (H) 06/18/2025    HDL 57 06/18/2025    TRIG 163 (H) 06/18/2025    BUN 15.2 07/16/2025     (L) 07/16/2025    CO2 29 07/16/2025       Lab Results   Component Value Date    WBC 9.2 07/16/2025    HGB 9.6 (L)  07/16/2025    HCT 30.5 (L) 07/16/2025    MCV 77 (L) 07/16/2025     07/16/2025       Lab Results   Component Value Date    TSH 7.18 (H) 06/18/2025               Current Inpatient Scheduled Medications   Scheduled Meds:  Current Facility-Administered Medications   Medication Dose Route Frequency Provider Last Rate Last Admin    aspirin EC tablet 81 mg  81 mg Oral Daily Beulah Kramer CNP        clopidogrel (PLAVIX) tablet 75 mg  75 mg Oral Daily Beulah Kramer CNP        hydroCHLOROthiazide tablet 12.5 mg  12.5 mg Oral BID Beulah Kramer CNP   12.5 mg at 07/15/25 1713    metoprolol tartrate (LOPRESSOR) tablet 25 mg  25 mg Oral BID Beulah Kramer CNP   25 mg at 07/15/25 2101    pantoprazole (PROTONIX) EC tablet 40 mg  40 mg Oral QAM AC Geraldo Haney MD        rosuvastatin (CRESTOR) tablet 40 mg  40 mg Oral Daily Beulah Kramer CNP   40 mg at 07/15/25 1248    spironolactone (ALDACTONE) tablet 25 mg  25 mg Oral Daily Beulah Kramer CNP   25 mg at 07/15/25 1248    topiramate (TOPAMAX) tablet 50 mg  50 mg Oral QPM Geraldo Haney MD   50 mg at 07/15/25 2101    venlafaxine (EFFEXOR XR) 24 hr capsule 75 mg  75 mg Oral Daily Beulah Kramer CNP   75 mg at 07/15/25 1249     Continuous Infusions:  Current Facility-Administered Medications   Medication Dose Route Frequency Provider Last Rate Last Admin    Percutaneous Coronary Intervention orders placed (this is information for BPA alerting)   Does not apply DOES NOT GO TO Beulah Mcmanus CNP        Reason beta blocker order not selected   Does not apply DOES NOT GO TO Beulah Mcmanus CNP                Medications Prior to Admission   Prior to Admission medications    Medication Sig Start Date End Date Taking? Authorizing Provider   acetaminophen (TYLENOL) 500 MG tablet Take 500-1,000 mg by mouth every 4 hours as needed for mild pain or fever.   Yes Unknown,  Entered By History   aspirin 81 MG EC tablet Take 1 tablet (81 mg) by mouth daily. Start tomorrow. 7/16/25  Yes Beulah Kramer CNP   budesonide (ENTOCORT EC) 3 mg 24 hr capsule [BUDESONIDE (ENTOCORT EC) 3 MG 24 HR CAPSULE] Take 3 mg by mouth every morning. 1/26/15  Yes Provider, Historical   calcium-vitamin D (CALCIUM-VITAMIN D) 500 mg(1,250mg) -200 unit per tablet [CALCIUM-VITAMIN D (CALCIUM-VITAMIN D) 500 MG(1,250MG) -200 UNIT PER TABLET] Take 2 tablets by mouth daily. 10/11/19  Yes Provider, Historical   clopidogrel (PLAVIX) 75 MG tablet Take 1 tablet (75 mg) by mouth daily. Dose to start tomorrow. 7/16/25  Yes Beulah Kramer CNP   hydrochlorothiazide (HYDRODIURIL) 25 MG tablet Take 25 mg by mouth every morning. 6/19/25  Yes Unknown, Entered By History   omeprazole (PRILOSEC) 20 MG DR capsule Take 20 mg by mouth daily. 7/10/25  Yes Unknown, Entered By History   ondansetron (ZOFRAN ODT) 4 MG ODT tab Take 1 tablet (4 mg) by mouth every 8 hours as needed for nausea 11/9/23  Yes Perfecto Major MD   rizatriptan (MAXALT) 10 MG tablet Take 10 mg by mouth at onset of headache for migraine. 4/8/25  Yes Unknown, Entered By History   rosuvastatin (CRESTOR) 40 MG tablet Take 1 tablet (40 mg) by mouth daily. 7/15/25  Yes Beulah Kramer CNP   spironolactone (ALDACTONE) 50 MG tablet Take 50 mg by mouth daily.   Yes Unknown, Entered By History   topiramate (TOPAMAX) 50 MG tablet 1/2 tablet (25 mg) x1 week, then 1 tablet (50 mg) Orally Once a day at bedtime; Duration: 90 days 7/10/25  Yes Unknown, Entered By History   venlafaxine (EFFEXOR-XR) 75 MG 24 hr capsule Take 75 mg by mouth daily. (Along with 150 mg for total of 225 mg daily) 1/26/15  Yes Beulah Escoto MD   vitamin D3 (CHOLECALCIFEROL) 50 mcg (2000 units) tablet Take 1 tablet by mouth daily.   Yes Unknown, Entered By History   budesonide (ENTOCORT EC) 3 MG EC capsule Take 6 mg by mouth daily. 5/28/25   Beulah Escoto MD   omeprazole  (PRILOSEC OTC) 20 MG EC tablet Take 20-40 mg by mouth daily.    Reported, Patient          Starla New PA-C

## 2025-07-16 NOTE — PLAN OF CARE
Occupational Therapy Discharge Summary    Reason for therapy discharge:    Discharged to home with outpatient therapy.    Progress towards therapy goal(s). See goals on Care Plan in [facility] electronic health record for goal details.  Goals partially met.  Barriers to achieving goals:   discharge from facility.    Therapy recommendation(s):    Continued therapy is recommended.  Rationale/Recommendations:  outpatient cardiac rehab.    SUSAN Mcguire, JENNA/L, 7/16/2025, 2:28 PM

## 2025-07-16 NOTE — PLAN OF CARE
Problem: Adult Inpatient Plan of Care  Goal: Readiness for Transition of Care  Outcome: Met     Problem: Cardiac Catheterization (Diagnostic/Interventional)  Goal: Optimal Pain Control and Function  Intervention: Prevent or Manage Pain  Recent Flowsheet Documentation  Taken 7/16/2025 0826 by Chuyita Macias RN  Pain Management Interventions:   medication (see MAR)   cold applied       VSS. Denies CP or SOB. Nausea this morning. Improved with PRN.     AVS reviewed with pt and son.

## 2025-07-16 NOTE — DISCHARGE SUMMARY
"[facility]  Hospitalist Discharge Summary      Date of Admission:  7/15/2025  Date of Discharge:  7/16/2025  Discharging Provider: Zelalem Fierro MD  Discharge Service: Hospitalist Service    Discharge Diagnoses   ACS with NSTEMI s/p coronary angiogram with stent placement  Hypertension  GERD   Mood disorder  Hyperlipidemia  Clinically Significant Risk Factors     # Obesity: Estimated body mass index is 39.23 kg/m  as calculated from the following:    Height as of an earlier encounter on 7/15/25: 1.422 m (4' 8\").    Weight as of this encounter: 79.4 kg (175 lb).       Follow-ups Needed After Discharge   Follow-up Appointments       Follow Up      Follow up with cardiology as scheduled        Hospital Follow-up with Existing Primary Care Provider (PCP)          Schedule Primary Care visit within: 30 Days               Unresulted Labs Ordered in the Past 30 Days of this Admission       No orders found for last 31 day(s).        These results will be followed up by cardiology     Discharge Disposition   Discharged to home  Condition at discharge: Stable    Hospital Course   70 year old female with past medical history of lumbar stenosis/decompression, hypertension, hyperlipidemia and GERD who presented to the [facility] emergency room complaining of chest pain and found to have NSTEMI, patient was transferred to our facility for coronary angiogram.  Cardiology consulted and patient had coronary angiogram on 7/15/2025 which showed  acute thrombotic plaque rupture involving mid RCA leading to SALLY x 2. Mild nonobstructive disease elsewhere cardiology commended aspirin and Plavix for minimum of 1 year, changing pravastatin to Crestor.  Also increasing metoprolol dose for better hypertension control.  Patient did much better next day with no chest pain or shortness of breath.  Cardiologist recommended discharge home with the above medication changes and follow-up as an outpatient.  Noted for " her GERD omeprazole is switched to pantoprazole as patient started on Plavix      Consultations This Hospital Stay   CARDIOLOGY IP CONSULT  HOSPITALIST IP CONSULT  NUTRITION SERVICES ADULT IP CONSULT  CARDIAC REHAB IP CONSULT  PHYSICAL THERAPY ADULT IP CONSULT  SMOKING CESSATION PROGRAM IP CONSULT    Code Status   Full Code    Time Spent on this Encounter   I, Zelalem Fierro MD, personally saw the patient today and spent greater than 30 minutes discharging this patient.       Zelalem Fierro MD  [facility] HEART [address] [address]  Phone: [phone]  Fax: [phone]  ______________________________________________________________________    Physical Exam   Vital Signs: Temp: 97.5  F (36.4  C) Temp src: Oral BP: 120/65 Pulse: 62   Resp: 20 SpO2: 93 % O2 Device: None (Room air)    Weight: 175 lbs 0 oz  Respiratory: Respirations even, unlabored, in no acute respiratory distress.  Lungs clear to auscultation bilaterally.  Cardiovascular: Regular rate and rhythm. Good peripheral perfusion.  Good radial pulses in bilateral upper extremities, appears symmetrical  GI: Abdomen soft, non-tender.  No guarding or rebound.  No pulsatile masses.  Musculoskeletal: Moves all 4 extremities equally.  Integument: Warm, dry.  No diaphoresis  Neurologic: Alert & oriented x 3. Normal speech. Grossly normal motor and sensory function. No focal deficits noted.  Psychiatric: Normal mood          Primary Care Physician   Beulah Escoto    Discharge Orders      Ambulatory CARDIAC REHAB REFERRAL      Ambulatory Cardiologist Referral      Follow-Up with Cardiology Ambulatory [facility] Referral      Reason for your hospital stay    ACS with NSTEMI     Activity    Your activity upon discharge: activity as tolerated     Follow Up    Follow up with cardiology as scheduled     Diet    Follow this diet upon discharge: Current Diet:Orders Placed This Encounter      Low Saturated Fat Na  <2400 mg     Hospital Follow-up with Existing Primary Care Provider (PCP)            Significant Results and Procedures   Most Recent 3 CBC's:  Recent Labs   Lab Test 25  0407 07/15/25  0810 07/15/25  0654   WBC 9.2 6.8 7.7   HGB 9.6* 9.5* 10.3*   MCV 77* 80 79    298 323     Most Recent 3 BMP's:  Recent Labs   Lab Test 25  0407 07/15/25  0713 07/15/25  0654 25  1029   *  --  136 137   POTASSIUM 3.5  --  3.4 3.5   CHLORIDE 93*  --  97* 96*   CO2 29  --  25 27   BUN 15.2  --  26.2* 22.3   CR 0.66 1.1* 0.92 0.88   ANIONGAP 9  --  14 14   JELLY 9.1  --  9.3 9.6   *  --  115* 91     Most Recent 2 LFT's:  Recent Labs   Lab Test 25  1029 24  1004   AST 28 26   ALT 14 16   ALKPHOS 95 105   BILITOTAL 0.3 0.2   ,   Results for orders placed or performed during the hospital encounter of 07/15/25   Echocardiogram Complete     Value    LVEF  55-60%    Narrative    [id number]  [id number]  [id number]  [id number]^MARY^[place] [address]     Name: MILES KASPER  MRN: [id number]  : 1955  Study Date: 07/15/2025 10:38 AM  Age: 70 yrs  Gender: Female  Patient Location: [facility]  Reason For Study: MI - Acute  Ordering Physician: AMALIA HERNANDEZ  Referring Physician: Drake Freire  Performed By: ^^^^     BSA: 1.7 m2  Height: 56 in  Weight: 170 lb  HR: 71  BP: 143/106 mmHg  ______________________________________________________________________________  Procedure  Echocardiogram with two-dimensional, color and spectral Doppler. Definity (NDC  #49039-538) given intravenously. Technically difficult study. Despite the use  of contrast, the posterior wall is not well visualized. No hemodynamically  significant valvular abnormalities on 2D or color flow imaging. There is no  comparison study available.  ______________________________________________________________________________  Interpretation Summary     Left  ventricular size, wall motion and function are normal. The ejection  fraction is 55-60%. Despite the use of contrast, the posterior wall is not  well visualized.  Normal right ventricle size and systolic function.  The left atrium is mildly dilated.  No hemodynamically significant valvular abnormalities on 2D or color flow  imaging.  There is no comparison study available.  ______________________________________________________________________________  Left Ventricle  Left ventricular size, wall motion and function are normal. The ejection  fraction is 55-60%. There is normal left ventricular wall thickness. Left  ventricular diastolic function is normal.     Right Ventricle  Normal right ventricle size and systolic function.     Atria  The left atrium is mildly dilated. Right atrial size is normal.     Mitral Valve  Mitral valve leaflets appear normal. There is no evidence of mitral stenosis  or clinically significant mitral regurgitation.     Tricuspid Valve  Tricuspid valve leaflets appear normal. There is no evidence of tricuspid  stenosis or clinically significant tricuspid regurgitation. Right ventricular  systolic pressure could not be approximated due to inadequate tricuspid  regurgitation.     Aortic Valve  The aortic valve is trileaflet. Aortic valve leaflets appear normal. There is  no evidence of aortic stenosis or clinically significant aortic regurgitation.     Pulmonic Valve  The pulmonic valve is not well seen, but is grossly normal. This degree of  valvular regurgitation is within normal limits. There is trace pulmonic  valvular regurgitation.     Vessels  The aorta root is normal. Normal size ascending aorta. IVC diameter <2.1 cm  collapsing >50% with sniff suggests a normal RA pressure of 3 mmHg.     Pericardium  There is no pericardial effusion.     Rhythm  Sinus rhythm was noted.  ______________________________________________________________________________  MMode/2D Measurements &  Calculations  IVSd: 0.85 cm     LVIDd: 4.5 cm  LVIDs: 3.5 cm  LVPWd: 0.81 cm  FS: 22.7 %  LV mass(C)d: 120.7 grams  LV mass(C)dI: 72.9 grams/m2  Ao root diam: 3.6 cm  LA dimension: 3.1 cm  asc Aorta Diam: 3.7 cm  LA/Ao: 0.87  LVOT diam: 2.2 cm  LVOT area: 3.9 cm2  Ao root diam index Ht(cm/m): 2.5  Ao root diam index BSA (cm/m2): 2.2  Asc Ao diam index BSA (cm/m2): 2.2  Asc Ao diam index Ht(cm/m): 2.6  EF Biplane: 56.6 %  LA Volume (BP): 64.4 ml     LA Volume Indexed (AL/bp): 40.5 ml/m2  RV Base: 3.3 cm  RWT: 0.36  TAPSE: 2.0 cm     Time Measurements  MM HR: 71.0 BPM     Doppler Measurements & Calculations  MV E max paresh: 48.0 cm/sec  MV A max paresh: 111.4 cm/sec  MV E/A: 0.43  MV max P.2 mmHg  MV mean P.7 mmHg  MV V2 VTI: 27.6 cm  MVA(VTI): 3.2 cm2  MV dec slope: 195.4 cm/sec2  MV dec time: 0.25 sec  Ao V2 max: 113.8 cm/sec  Ao max P.0 mmHg  Ao V2 mean: 83.9 cm/sec  Ao mean PG: 3.1 mmHg  Ao V2 VTI: 25.1 cm  LEONARDO(I,D): 3.6 cm2  LEONARDO(V,D): 3.5 cm2  AI P1/2t: 480.1 msec     LV V1 max P.4 mmHg  LV V1 max: 104.5 cm/sec  LV V1 VTI: 23.1 cm  SV(LVOT): 89.2 ml  SI(LVOT): 53.9 ml/m2  PA acc time: 0.09 sec  AV Paresh Ratio (DI): 0.92  LEONARDO Index (cm2/m2): 2.1  E/E' av.7  Lateral E/e': 6.0  Medial E/e': 7.4  RV S Paresh: 12.8 cm/sec     ______________________________________________________________________________  Report approved by: Baljit Gaines MD on 07/15/2025 11:46 AM         Cardiac Catheterization    Narrative    CARDIAC CATHETERIZATION AND INTERVENTION REPORT    PATIENT NAME:  Arlene Becker          MEDICAL RECORD NUMBER: [id number]  : 1955       PROCEDURE DATE: July 15, 2025        CONCLUSIONS:   Acute thrombotic plaque rupture involving the mid RCA with mild   nonobstructive disease elsewhere, post successful PCI with a 4.0 x 24 and   a 4.0 x 48 mm Synergy XD drug-eluting stents.  Mildly elevated left-sided filling pressures.  No aortic valve stenosis.      RECOMMENDATIONS:   Usual postprocedure  cares, please see orders.  DAPT for minimum of 1 year.  Aggressive risk factor modification for secondary prevention.    PROCEDURE PERFORMED:   Selective right and left coronary angiography  Left heart catheterization  PCI of the RCA    PRE-OP DIAGNOSIS:  High risk non-ST elevation MI with ongoing chest pain despite medical   therapy    POST-OP DIAGNOSIS:   High risk non-ST elevation MI with ongoing chest pain despite medical   therapy    PROCEDURALISTS: Drake Freire MD      DIAGNOSTIC PROCEDURAL DETAILS:   Signed, informed consent was obtained. The patient was placed on the table   and prepped and draped in the usual fashion. Time out and site   verification performed.   Access: Right radial artery access was attempted but was unsuccessful   (unable to thread the wire despite multiple attempts).  Right common   femoral artery access was obtained using ultrasound guidance and   micropuncture technique.  Catheters: JL 4, JR4  Hemostasis: Angio-Seal    PROCEDURAL MEDICATIONS:  Conscious sedation - midazolam and fentanyl, please see procedure log for   dosing.   Local anesthesia - 1% lidocaine   Procedural anticoagulation - 75 units/kg of heparin and intermittent   boluses to maintain ACT above 250-300.  Patient was loaded with 180 mg of   Brilinta at the end of procedure.    CONTRAST VOLUME: 110 mL Visipaque  BLOOD LOSS: minimal   FLUIDS: None   SPECIMENS: None  COMPLICATIONS: None    FINDINGS:  Left Heart Catheterization    LVEDP 20 mmHg  No significant gradient across the aortic valve.    Coronary Angiography:  LEFT MAIN: Normal caliber vessel that bifurcates into left anterior   descending and left circumflex arteries.  The left main has mild luminal   irregularities.  LEFT ANTERIOR DESCENDING: Small caliber vessel that gives rise to a medium   size diagonal branch and multiple septal perforators.  The LAD tapers into   a small vessel distally.  The LAD and its branches have mild disease.  LEFT CIRCUMFLEX:  Nondominant vessel that gives rise to a large OM branch   and terminates into a small vessel distally.  The left circumflex and its   branch have mild disease.  RIGHT CORONARY ARTERY: Large dominant vessel that gives rise to right   posterior descending artery posterolateral system.  The proximal right   coronary artery has mild disease followed by 95% acute thrombotic lesion   in the mid to distal portion.  Distally the right coronary artery has mild   disease.      INTERVENTIONAL DETAILS:   Lesion # 1 : 95% acute thrombotic lesion of the right coronary artery  The right coronary artery was engaged using a JR4 guide catheter that   provide adequate coaptation and support.  Guide extension was used to   further help deliver stents and balloons.  The right coronary artery   lesion was traversed with a Runthrough wire was predilated with a 2.5 mm   balloon.  The lesion was treated with a 4.0 x 48 and a 4.0 x 24 mm Synergy   XD drug-eluting stent that were postdilated with a 4.0 mm NC balloon at   high atmospheres.  Preintervention the lesion length was 44 mm with ESTHER   III flow.  Postintervention there is 0% residual stenosis within treated   segments with ESTHER-3 flow.    Please do not hesitate to contact me if you have any questions or   concerns. I was present for the procedure in its entirety. Moderate   conscious sedation at level 3-4 with fentanyl and midazolam was   administered, and I spent continuous face to face time with the patient   which encompassed the duration of the procedure.  Please refer to the   sedation flow sheet for additional information.  I have reviewed and agree   with the documentation provided in the RN sedation flow sheet as outlined.   I concluded sedation and recovery was monitored by the trained independent   observer. I attest that I have ordered all medications administered,   equipment used, and tests performed during the procedure.    Drake Freire MD  Interventional Cardiology         Discharge Medications      Review of your medicines        START taking        Dose / Directions   aspirin 81 MG EC tablet      Dose: 81 mg  Take 1 tablet (81 mg) by mouth daily. Start tomorrow.  Quantity: 30 tablet  Refills: 3     clopidogrel 75 MG tablet  Commonly known as: PLAVIX      Dose: 75 mg  Take 1 tablet (75 mg) by mouth daily. Dose to start tomorrow.  Quantity: 90 tablet  Refills: 3     metoprolol succinate ER 25 MG 24 hr tablet  Commonly known as: TOPROL XL      Dose: 75 mg  Take 3 tablets (75 mg) by mouth daily.  Quantity: 270 tablet  Refills: 0     pantoprazole 40 MG EC tablet  Commonly known as: PROTONIX  Used for: Gastroesophageal reflux disease with esophagitis, unspecified whether hemorrhage      Dose: 40 mg  Take 1 tablet (40 mg) by mouth daily.  Quantity: 90 tablet  Refills: 0            CHANGE how you take these medications        Dose / Directions   rosuvastatin 40 MG tablet  Commonly known as: CRESTOR  This may have changed:   medication strength  how much to take  when to take this      Dose: 40 mg  Take 1 tablet (40 mg) by mouth daily.  Quantity: 90 tablet  Refills: 3            CONTINUE these medicines which have NOT CHANGED        Dose / Directions   acetaminophen 500 MG tablet  Commonly known as: TYLENOL      Dose: 500-1,000 mg  Take 500-1,000 mg by mouth every 4 hours as needed for mild pain or fever.  Refills: 0     * budesonide 3 MG 24 hr capsule  Commonly known as: ENTOCORT EC      Dose: 3 mg  [BUDESONIDE (ENTOCORT EC) 3 MG 24 HR CAPSULE] Take 3 mg by mouth every morning.  Refills: 0     * budesonide 3 MG EC capsule  Commonly known as: ENTOCORT EC      Dose: 6 mg  Take 6 mg by mouth daily.  Refills: 0     calcium carbonate-vitamin D 500-200 MG-UNIT tablet  Commonly known as: OSCAL w/D      Dose: 2 tablet  [CALCIUM-VITAMIN D (CALCIUM-VITAMIN D) 500 MG(1,250MG) -200 UNIT PER TABLET] Take 2 tablets by mouth daily.  Refills: 0     hydrochlorothiazide 25 MG tablet  Commonly known  as: HYDRODIURIL      Dose: 25 mg  Take 25 mg by mouth every morning.  Refills: 0     ondansetron 4 MG ODT tab  Commonly known as: ZOFRAN ODT  Used for: Postoperative nausea      Dose: 4 mg  Take 1 tablet (4 mg) by mouth every 8 hours as needed for nausea  Quantity: 8 tablet  Refills: 0     rizatriptan 10 MG tablet  Commonly known as: MAXALT      Dose: 10 mg  Take 10 mg by mouth at onset of headache for migraine.  Refills: 0     spironolactone 50 MG tablet  Commonly known as: ALDACTONE      Dose: 50 mg  Take 50 mg by mouth daily.  Refills: 0     topiramate 50 MG tablet  Commonly known as: TOPAMAX      1/2 tablet (25 mg) x1 week, then 1 tablet (50 mg) Orally Once a day at bedtime; Duration: 90 days  Refills: 0     venlafaxine 75 MG 24 hr capsule  Commonly known as: EFFEXOR XR      Dose: 75 mg  Take 75 mg by mouth daily. (Along with 150 mg for total of 225 mg daily)  Refills: 0     vitamin D3 50 mcg (2000 units) tablet  Commonly known as: CHOLECALCIFEROL      Dose: 1 tablet  Take 1 tablet by mouth daily.  Refills: 0           * This list has 2 medication(s) that are the same as other medications prescribed for you. Read the directions carefully, and ask your doctor or other care provider to review them with you.                STOP taking      omeprazole 20 MG DR capsule  Commonly known as: PriLOSEC        pravastatin 20 MG tablet  Commonly known as: PRAVACHOL        priLOSEC OTC 20 MG EC tablet  Generic drug: omeprazole                  Where to get your medicines        These medications were sent to [place] [id number] [place] [address] [address]      Phone: [phone]   aspirin 81 MG EC tablet  clopidogrel 75 MG tablet  metoprolol succinate ER 25 MG 24 hr tablet  pantoprazole 40 MG EC tablet  rosuvastatin 40 MG tablet       Allergies   Allergies   Allergen Reactions    Atorvastatin Other (See Comments) and Muscle Pain (Myalgia)    Indomethacin Other (See Comments) and  Hives    Nitrofurantoin Hives    Simvastatin Other (See Comments) and Muscle Pain (Myalgia)    Sulfa (Sulfonamide Antibiotics) [Sulfa Antibiotics] Hives

## 2025-07-16 NOTE — PROGRESS NOTES
Physical Therapy: Orders received. Chart reviewed and discussed with care team.? Physical Therapy not indicated due to per OT evaluation patient is doing well and has no needs for acute,skilled PT.? Defer discharge recommendations to treatment team.? Will complete orders.

## 2025-07-16 NOTE — PROGRESS NOTES
PRIMARY DIAGNOSIS: CHEST PAIN  OUTPATIENT/OBSERVATION GOALS TO BE MET BEFORE DISCHARGE:  1. Ruled out acute coronary syndrome (negative or stable Troponin):  angiogram done  2. Pain Status: Pain free.  3. Appropriate provocative testing performed: Yes  - Stress Test Procedure: no  - Interpretation of cardiac rhythm per telemetry tech: SR    4. Cleared by Consultants (if applicable):Yes  5. Return to near baseline physical activity: Yes  Discharge Planner Nurse   Safe discharge environment identified: Yes  Barriers to discharge: No       Entered by: Chuyita Mcaias RN 07/16/2025 1:19 PM     Please review provider order for any additional goals.   Nurse to notify provider when observation goals have been met and patient is ready for discharge.

## 2025-07-16 NOTE — PROGRESS NOTES
Cardiac rehab      07/16/25 1100   Appointment Info   Signing Clinician's Name / Credentials (OT) Ludmila Charles OTR/L   Living Environment   People in Home alone   Current Living Arrangements house   Home Accessibility no concerns   Transportation Anticipated family or friend will provide   Living Environment Comments Pt lives alone family  supports as needed   Self-Care   Usual Activity Tolerance good   Current Activity Tolerance good   Regular Exercise No   Equipment Currently Used at Home none   Fall history within last six months no   Activity/Exercise/Self-Care Comment Ind. w/ ADL routine   Instrumental Activities of Daily Living (IADL)   IADL Comments Ind. w/ IADL routine at baseline   General Information   Onset of Illness/Injury or Date of Surgery 07/15/25   Referring Physician Drake Freire MD   Patient/Family Therapy Goal Statement (OT) to get home   Additional Occupational Profile Info/Pertinent History of Current Problem 70-year-old female with past medical history of hypertension, hyperlipidemia, tobacco use, family history of coronary artery disease who presented to the  with chest discomfort that occurred around midnight and radiated into the left arm.  Troponin elevated at 269 and EKG with nonspecific ST changes.  Patient was transferred to [facility] and underwent coronary angiogram 7/15 that showed acute thrombotic plaque rupture involving mid RCA mild nonobstructive disease elsewhere.  This led to SALLY x 2 to RCA   Existing Precautions/Restrictions cardiac   Cognitive Status Examination   Orientation Status orientation to person, place and time   Range of Motion Comprehensive   General Range of Motion no range of motion deficits identified   Bed Mobility   Bed Mobility No deficits identified   Transfers   Transfers sit-stand transfer   Sit-Stand Transfer   Sit-Stand [place] (Transfers) verbal cues;supervision   Balance   Balance Assessment no deficits identified   Activities  of Daily Living   BADL Assessment/Intervention no deficits identified   Clinical Impression   Criteria for Skilled Therapeutic Interventions Met (OT) Yes, treatment indicated   OT Diagnosis decreased act tolerance   OT Problem List-Impairments impacting ADL problems related to;activity tolerance impaired;balance;mobility;strength;post-surgical precautions   Assessment of Occupational Performance 1-3 Performance Deficits   Identified Performance Deficits endurance/act. tolerance, trnf safety, prec. following angio   Planned Therapy Interventions (OT) ADL retraining;transfer training;strengthening;progressive activity/exercise;home program guidelines   Clinical Decision Making Complexity (OT) problem focused assessment/low complexity   Risk & Benefits of therapy have been explained evaluation/treatment results reviewed;risks/benefits reviewed;patient   OT Total Evaluation Time   OT Eval, Low Complexity Minutes (50077) 10   OT Goals   Therapy Frequency (OT) One time eval and treatment   OT Predicted Duration/Target Date for Goal Attainment 07/16/25   OT Goals Cardiac Phase 1   OT: Understanding of cardiac education to maximize quality of life, condition management, and health outcomes Patient;Verbalize;Demonstrate   OT: Perform aerobic activity with stable cardiovascular response continuous;ambulation;20 minutes   OT: Functional/aerobic ambulation tolerance with stable cardiovascular response in order to return to home and community environment Modified independent;Greater than 300 feet   OT: Navigation of stairs simulating home set up with stable cardiovascular response in order to return to home and community environment Modified independent;Greater than 10 stairs   Self-Care/Home Management   Self-Care/Home Mgmt/ADL, Compensatory, Meal Prep Minutes (36834) 8   Treatment Detail/Skilled Intervention see cardiac education tab for details. SBA all ambulation for detaisl   Therapeutic Procedures/Exercise   Therapeutic  Procedure: strength, endurance, ROM, flexibillity minutes (81630) 15   Treatment Detail/Skilled Intervention see ambulation tab   Treatment Time Includes (CR Only) See specific exercise details intervention group(s)   Ambulation   Workload 400ft   Symptoms Fatigue   Cardiovascular Response Normal   Exercise Details SBA w/o device no LOB noted   Vital Signs Details pre/post WFL   Cardiac Education   Education Provided Stop light tool;Signs and symptoms;Risk factors;Resuming home activities;Precautions;Outpatient Cardiac Rehab;OMNI Scale;Home exercise program;Energy conservation;Diet;Diagnosis;Daily weights   Education Packet Given to Patient Yes   All Patient Education Handouts Reviewed with Patient and/or Family Yes   Cardiac Rehab Phase II Plan   Phase II Order Received Yes   Phase II Appointment Status Scheduled   Date/Time 7/21/25   Location [facility]   OT Discharge Planning   OT Plan dc cardiac rehab   OT Discharge Recommendation (DC Rec) home with outpatient cardiac rehab   OT Rationale for DC Rec Pt lives alone in home, pt moving well w/o device no c/o symptoms.Anticipate pt to progress for safe d/c  home when medically ready.   OT Total Distance Amb During Session (feet) 400   Total Session Time   Timed Code Treatment Minutes 23   Total Session Time (sum of timed and untimed services) 33

## 2025-08-25 ENCOUNTER — TELEPHONE (OUTPATIENT)
Dept: CARDIOLOGY | Facility: CLINIC | Age: 70
End: 2025-08-25
Payer: COMMERCIAL

## (undated) DEVICE — CATH BALLOON NC EMERGE 4.00X20MM H7493926720400

## (undated) DEVICE — SOL WATER IRRIG 1000ML BOTTLE 2F7114

## (undated) DEVICE — CLIP LIGACLIP LG YELLOW LT400

## (undated) DEVICE — TUBING SMOKE EVAC PNEUMOCLEAR HIGH FLOW 0620050250

## (undated) DEVICE — ENDO TROCAR SLEEVE KII Z-THREADED 05X100MM CTS02

## (undated) DEVICE — DRESSING COVERLET 3-1/4 X3-3/8 66000709

## (undated) DEVICE — SUTURE VICRYL+ 4-0 UNDYED PS-2 VCP496H

## (undated) DEVICE — MANIFOLD KIT ANGIO AUTOMATED 014613

## (undated) DEVICE — BASIN EMESIS STERILE  SSK9005A

## (undated) DEVICE — ELECTRODE DEFIB CADENCE 22550R

## (undated) DEVICE — CATH GUIDELINER 6FR 5571

## (undated) DEVICE — ENDO TROCAR FIRST ENTRY KII FIOS Z-THRD 11X100MM CTF33

## (undated) DEVICE — PLATE GROUNDING ADULT W/CORD 9165L

## (undated) DEVICE — GUIDEWIRE VASC 0.014INX180CM RUNTHROUGH 25-1011

## (undated) DEVICE — SHTH INTRO 0.021IN ID 6FR DIA

## (undated) DEVICE — CATH ANGIO JUDKINS JL4 6FRX100CM INFINITI 534620T

## (undated) DEVICE — DEVICE INFLATION SYR W/ HEMOSTASIS VALVE 12IN EXT IN4904

## (undated) DEVICE — CATH LAUNCHER 6FR JR 4.0 LA6JR40

## (undated) DEVICE — ENDO TROCAR FIRST ENTRY KII FIOS Z-THRD 05X100MM CTF03

## (undated) DEVICE — ESU CORD ACTIVE BLUE 92002

## (undated) DEVICE — A3 SUPPLIES- SEE NURSING INFO PAGE

## (undated) DEVICE — CUSTOM PACK LAP CHOLE SBA5BLCHEA

## (undated) DEVICE — DECANTER VIAL 2006S

## (undated) DEVICE — SYR 30ML LL W/O NDL 302832

## (undated) DEVICE — SLEEVE TR BAND RADIAL COMPRESSION DEVICE 24CM TRB24-REG

## (undated) DEVICE — EXCHANGE WIRE .035 260 STAR/JFC/035/260/ M001491681

## (undated) DEVICE — CATH ANGIO JUDKINS JL3.5 6FRX100CM INFINITI 534618T

## (undated) DEVICE — VALVE HEMOSTATIC WATCHDOG 8FR INNER LUMEN H74939343021

## (undated) DEVICE — ENDO SHEARS RENEW LAP ENDOCUT SCISSOR TIP 16.5MM 3142

## (undated) DEVICE — KIT HAND CONTROL ACIST 014644 AR-P54

## (undated) DEVICE — GLOVE BIOGEL PI SZ 8.0 40880

## (undated) DEVICE — CUSTOM PACK CORONARY SAN5BCRHEA

## (undated) DEVICE — PREP CHLORAPREP 26ML TINTED HI-LITE ORANGE 930815

## (undated) DEVICE — CATH ANGIO INFINITI PIGTAIL 6FRX110CM 6 SH 534650S

## (undated) DEVICE — SUTURE PASSOR W/GUIDE RSG-14F-4-WG

## (undated) DEVICE — INTRO SHEATH 6FRX10CM PINNACLE RSS602

## (undated) DEVICE — INTRO MICRO MINI STICK 4FR STIFF NITINOL 45-753

## (undated) DEVICE — SU VICRYL+ 0 27 UR6 VLT VCP603H

## (undated) DEVICE — CATH ANGIO JUDKINS R4 6FRX100CM INFINITI 534621T

## (undated) DEVICE — SYR ANGIOGRAPHY MULTIUSE KIT ACIST 014612

## (undated) DEVICE — DRSG STERI STRIP 1/2X4" R1547

## (undated) DEVICE — CATH BALLOON EMERGE 2.5X20MM H7493918920250

## (undated) RX ORDER — PROPOFOL 10 MG/ML
INJECTION, EMULSION INTRAVENOUS
Status: DISPENSED
Start: 2023-11-04

## (undated) RX ORDER — TICAGRELOR 90 MG/1
TABLET, FILM COATED ORAL
Status: DISPENSED
Start: 2025-07-15

## (undated) RX ORDER — FENTANYL CITRATE 50 UG/ML
INJECTION, SOLUTION INTRAMUSCULAR; INTRAVENOUS
Status: DISPENSED
Start: 2023-11-04

## (undated) RX ORDER — LIDOCAINE HYDROCHLORIDE 10 MG/ML
INJECTION, SOLUTION EPIDURAL; INFILTRATION; INTRACAUDAL; PERINEURAL
Status: DISPENSED
Start: 2023-11-04

## (undated) RX ORDER — FENTANYL CITRATE 50 UG/ML
INJECTION, SOLUTION INTRAMUSCULAR; INTRAVENOUS
Status: DISPENSED
Start: 2025-07-15

## (undated) RX ORDER — ONDANSETRON 2 MG/ML
INJECTION INTRAMUSCULAR; INTRAVENOUS
Status: DISPENSED
Start: 2023-11-04

## (undated) RX ORDER — DEXAMETHASONE SODIUM PHOSPHATE 10 MG/ML
INJECTION, EMULSION INTRAMUSCULAR; INTRAVENOUS
Status: DISPENSED
Start: 2023-11-04